# Patient Record
Sex: FEMALE | Race: BLACK OR AFRICAN AMERICAN | Employment: UNEMPLOYED | URBAN - METROPOLITAN AREA
[De-identification: names, ages, dates, MRNs, and addresses within clinical notes are randomized per-mention and may not be internally consistent; named-entity substitution may affect disease eponyms.]

---

## 2017-01-04 ENCOUNTER — HOSPITAL ENCOUNTER (OUTPATIENT)
Dept: PHYSICAL THERAPY | Age: 42
Discharge: HOME | End: 2017-01-04
Attending: FAMILY MEDICINE
Payer: MEDICARE

## 2017-01-04 ENCOUNTER — HOSPITAL ENCOUNTER (OUTPATIENT)
Dept: GENERAL RADIOLOGY | Age: 42
Discharge: HOME | End: 2017-01-04
Attending: FAMILY MEDICINE
Payer: MEDICARE

## 2017-01-04 DIAGNOSIS — R13.12 OROPHARYNGEAL DYSPHAGIA: ICD-10-CM

## 2017-01-04 PROCEDURE — G8998 SWALLOW D/C STATUS: HCPCS

## 2017-01-04 PROCEDURE — 74230 X-RAY XM SWLNG FUNCJ C+: CPT

## 2017-01-04 PROCEDURE — 92611 MOTION FLUOROSCOPY/SWALLOW: CPT

## 2017-01-04 PROCEDURE — 74011000255 HC RX REV CODE- 255

## 2017-01-04 PROCEDURE — G8997 SWALLOW GOAL STATUS: HCPCS

## 2017-01-04 PROCEDURE — G8996 SWALLOW CURRENT STATUS: HCPCS

## 2017-01-04 RX ADMIN — BARIUM SULFATE 15 ML: 400 SUSPENSION ORAL at 10:55

## 2017-01-04 RX ADMIN — BARIUM SULFATE 30 ML: 0.81 POWDER, FOR SUSPENSION ORAL at 10:55

## 2017-01-04 RX ADMIN — BARIUM SULFATE 15 ML: 400 PASTE ORAL at 10:55

## 2017-01-19 ENCOUNTER — HOSPITAL ENCOUNTER (OUTPATIENT)
Dept: PHYSICAL THERAPY | Age: 42
Discharge: HOME | End: 2017-01-19
Payer: MEDICARE

## 2017-01-19 PROCEDURE — 97110 THERAPEUTIC EXERCISES: CPT

## 2017-01-19 PROCEDURE — 92507 TX SP LANG VOICE COMM INDIV: CPT

## 2017-01-19 PROCEDURE — 92526 ORAL FUNCTION THERAPY: CPT

## 2017-01-19 NOTE — PROGRESS NOTES
Yaneth Gavin 31  [facility] PHYSICAL THERAPY  [address] [facility] [address], [address] - Phone: [phone]  Fax: [phone] [address]          Patient Name: Claudia Blankenship : 1975   Treatment/Medical Diagnosis: Dysphagia, post-stroke [I69.391]   Onset Date: Chronic, CVA 2014    Referral Source: Randolph [facility]): 2016   Prior Hospitalization: See Medical History Provider #: [id number]   Prior Level of Function: WC dependent since CVA in 2014; requires assistance with bed mobility   Comorbidities: HTN   Medications: Verified on Patient Summary List   Visits from [facility]: 2 Missed Visits: 0     Goal/Measure of Progress Goal Met? 1. Patient will require mod A X 1 with rolling to R side to allow caregivers greater ease with ADL's. Status at last Eval: Mod/max A x 2 Current Status: NT no   2. Patient will be compliant with home exercise program.   Status at last Eval: NA Current Status: Not initiated no     Key Functional Changes/Progress: Pt presented to PT clinic on 2017 for first appointment since initial evaluation 2016. Pt's mother reports that they had noticed increased depression of skull R temporal region a few months ago. Pt's reports that recently they noticed swelling R side of face ~ 3 weeks ago. Pt's mother reports that they saw neurologist at the end of December, and he ordered CT scan, which was performed 2016. 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 Pt's mother reports that they have noticed no change in function. Pt's mother reports that they have been doing leg exercises at home, and that they have been transferring her with [facility] lift.  Pt's mother reports that neurologist did not recommend that helmet be worn, and stated that pt should continue with PT.  OT attempted to call neurologist to await clarification of any restrictions related to issues with skull flap. We are awaiting return call, and therefore, did not transfer patient out of [facility] during 1/19/2017 treatment session. LE strength was assessed in w/c and is as follows:  R hip flex = 3/5  L hip flex = 3-/5  R hip abd/add = 3-/5  L hip abd/add = 2-/5  B knee ext = 4/5  B knee flex = 4/5  R ankle DF = 3-/5  L ankle DF = 4/5  R ankle PF = 4-/5  L ankle PF = 4-/5  Problem List: pain affecting function, decrease ROM, decrease strength, impaired gait/ balance, decrease ADL/ functional abilitiies, decrease activity tolerance, decrease flexibility/ joint mobility and decrease transfer abilities   Treatment Plan may include any combination of the following: Therapeutic exercise, Therapeutic activities, Neuromuscular re-education, Physical agent/modality, Gait/balance training, Manual therapy, Patient education, Functional mobility training, Home safety training and Stair training  Patient Goal(s) has been updated and includes:      Goals for this certification period include and are to be achieved in   2-4  weeks:   1. Patient will require min A X 1 with rolling to R side to allow caregivers greater ease with ADL's. .  2. Patient will require mod A X 1 with sit <> supine tranfers to improve independence with transfers and decrease load on caregiver. 3. Patient will be independent with home exercise program.  Frequency / Duration:   Patient to be seen   2   times per week for   2-4    weeks:  G-Codes (GP): Mobility [id number] Current  CN= 100%   Goal  CK= 40-59%. The severity rating is based on the Other supine to sit transfer    Assessments/Recommendations: Patient would benefit from continued skilled PT services to address decreased ROM, decreased strength, decreased balance and impaired functional mobility.   Please clarify whether it is safe for patient to transfer out of / using transfer board at this time, as we do not have a Jeff lift available in the clinic. Also, please clarify whether pt needs to wear a helmet for such transfers, and if there are any other restrictions that should be followed until pt undergoes surgery. Patient will be discharged from outpt PT when she goes in for surgery, and will need clearance to resume PT following surgery. If you have any questions/comments please contact us directly at [phone]. Thank you for allowing us to assist in the care of your patient. Therapist Signature: Melisa Paez PT Date: 1/09/2039   Certification Period:  Reporting Period: 12/6/2016-3/5/2017  12/6/2016-1/19/2017 Time: 12:02 PM   NOTE TO PHYSICIAN:  PLEASE COMPLETE THE ORDERS BELOW AND FAX TO   [facility] Physical Therapy: [phone]. If you are unable to process this request in 24 hours please contact our office: [phone].    ___ I have read the above report and request that my patient continue as recommended.   ___ I have read the above report and request that my patient continue therapy with the following changes/special instructions: ________________________________________________   ___ I have read the above report and request that my patient be discharged from therapy.      Physician Signature:        Date:       Time:

## 2017-01-19 NOTE — PROGRESS NOTES
Yaneth Gavin 31  [facility] PHYSICAL THERAPY  [address] [facility] [address], [address] - Phone: [phone]  Fax: [phone]  [facility] THERAPY          Patient Name: Max Dominguez : 1975   Medical/Treatment Diagnosis: Generalized muscle weakness [M62.81]  Stroke [facility]) [I63.9]   Onset Date:     Referral Source: [facility]): 16   Prior Hospitalization: See Medical History Provider #: [id number]   Prior Level of Function: Dep since CVA   Comorbidities: na   Medications: Verified on Patient Summary List   Visits from [facility]: 1 Missed Visits: 0     Goal/Measure of Progress Goal Met? 1. Ind HEP   Status at last Eval: Provided  Current Status: Teaching family Progressing    2. Left SH flexion to 75 degrees to assist with dressing    Status at last Eval: 0-25 Current Status: 0-25 no   3. Wash chest with set up    Status at last Eval: Unable  Current Status: unable no   4. Left SH abd to 75 degrees to ease donning deodorant   Status at last Eval: Unable  Current Status: unable no     Key Functional Changes/Progress: Patient only seen once since eval so goals carry over. Problem List: decrease ROM, decrease strength, decrease ADL/ functional abilitiies and decrease transfer abilities   Treatment Plan may include any combination of the following: Therapeutic exercise, Therapeutic activities, Neuromuscular re-education, Manual therapy, Patient education and ADLs/IADLs  Patient Goal(s) has been updated and includes:  Walk and talk again.  Goals for this certification period include and are to be achieved in   4  weeks: 1. Shoulder flexion to 75 degrees to assist with dressing   2. Shoulder abd to 75 degrees to ease donning deodorant  3. Wash chest with set up  4. Don deodorant with set up  5.  Doff overhead shirt with min A    Frequency / Duration:   Patient to be seen   2   times per week for   4 weeks:  G-Codes (GO): na  Assessments/Recommendations: Continue out patient OT to address goals    If you have any questions/comments please contact us directly at [phone]. Thank you for allowing us to assist in the care of your patient. Therapist Signature: SYLVIA Montelongo/L Date: 5/01/1831   Certification Period:  Reporting Period: 12/6/16-3/5/17  12/6/16-1/19/17 Time: 12:27 PM   NOTE TO PHYSICIAN:  PLEASE COMPLETE THE ORDERS BELOW AND FAX TO   [facility] Physical Therapy: [phone]. If you are unable to process this request in 24 hours please contact our office: [phone].    ___ I have read the above report and request that my patient continue as recommended.   ___ I have read the above report and request that my patient continue therapy with the following changes/special instructions: ________________________________________________   ___ I have read the above report and request that my patient be discharged from therapy.      Physician Signature:        Date:       Time:

## 2017-01-19 NOTE — PROGRESS NOTES
PHYSICAL THERAPY - DAILY TREATMENT NOTE    Patient Name: Jaci Blackburn        Date: 2017  : 1975   YES Patient  Verified  Visit #:   2   of   8  Insurance: Payor: BLUE CROSS MEDICARE / Plan: [facility] / Product Type: Managed Care Medicare /      In time: 10:00 Out time: 10:30   Total Treatment Time: 30     Medicare Time Tracking (below)   Total Timed Codes (min):  NA 1:1 Treatment Time:  NA     TREATMENT AREA =  Dysphagia, post-stroke [I69.391]  SUBJECTIVE    Pain Level (on 0 to 10 scale):  0  / 10   Medication Changes/New allergies or changes in medical history, any new surgeries or procedures? NO    If yes, update Summary List   Subjective Functional Status/Changes:  []  No changes reported     Pt's mother reports that they had noticed increased depression of skull R temporal region a few months ago. Pt's reports that recently they noticed swelling R side of face ~ 3 weeks ago. Pt's mother reports that they saw neurologist at the end of December, and he ordered CT scan, which was performed 2016. 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 Pt's mother reports that they have noticed no change in function. Pt's mother reports that they have been doing leg exercises at home, and that they have been transferring her with Elana Roberto lift.   Pt's mother reports that neurologist did not recommend that helmet be worn, and stated that pt should continue with PT.        OBJECTIVE    20 min Therapeutic Exercise:  [x]  See flow sheet   Rationale:      increase ROM and increase strength to improve the patients ability to perform ADLs/IADLs, functional mobility safely with increased independence     10 min Patient Education:  YES  Reviewed HEP   []  Progressed/Changed HEP based on:   Discussed that we will need clearance from neurologist to resume out of w/c activity as we do not have Srinivas Johnson and would have to perform sliding board transfer; also advised that will need to DC prior to surgery and will need new script to resume PT after surgery     Other Objective/Functional Measures:    LE strength:  R hip flex = 3/5  L hip flex = 3-/5  R hip abd/add = 3-/5  L hip abd/add = 2-/5  B knee ext = 4/5  B knee flex = 4/5  R ankle DF = 3-/5  L ankle DF = 4/5  R ankle PF = 4-/5  L ankle PF = 4-/5     Post Treatment Pain Level (on 0 to 10) scale:   0  / 10     ASSESSMENT    Assessment/Changes in Function:     LE strength improved  Mobility not assessed due to awaiting clearance from neurologist     [x]  See Progress Note/Recertification   Patient will continue to benefit from skilled PT services to modify and progress therapeutic interventions, address functional mobility deficits, address ROM deficits, address strength deficits, analyze and address soft tissue restrictions, analyze and cue movement patterns, analyze and modify body mechanics/ergonomics, assess and modify postural abnormalities, address imbalance/dizziness and instruct in home and community integration to attain remaining goals.    Progress toward goals / Updated goals:    See progress note     PLAN    [x]  Upgrade activities as tolerated YES Continue plan of care   []  Discharge due to :    []  Other:      Therapist: Brie Presley PT    Date: 1/19/2017 Time: 9:59 AM     Future Appointments  Date Time Provider Cale Fan   1/19/2017 10:00 AM Brie Presley PT [facility]

## 2017-01-19 NOTE — PROGRESS NOTES
[address] [place] [address]. Speech Language Pathology: Daily Note      Patient Name: Hattie Willett   2017   : 1975  [x]  Patient  Verified  Payor: BLUE CROSS MEDICARE / Plan: [facility] / Product Type: Managed Care Medicare /   In time:0900  Out time:1000  Total Treatment Time (min): 60  1:1 Treatment Time ( [address] Only): 60  Visit #: 1 of 8-10    SUBJECTIVE  Pain Level (0-10 scale): 0    Subjective functional status/changes:   Patient's mother reported barely utilize PEG tube for feedings. Further, brother and mother report no desire for AAC device. OBJECTIVE  Treatment provided includes the following. Increase/Improve:  []  Voice Quality []  Expressive Language [x]  Oral Motor Skills   []  Vocal Loudness []  Auditory Comprehension [x]  Eating/Swallowing Skills   []  Vocal Cord Function []  Writing Skills []  Laryngeal/Pharyngeal Function   []  Resonance []  Reading Comprehension []   []  Breath Support/Coord. []  Cognitive-Linguistic Skills []   []  Speech Intelligibility []  Safety Awareness []   [x]  Articulation []  Attention []   []  Fluency []  Memory []     Decrease:  [x]  Dysphagia []  Apraxia []  Dysphonia   [x]  Dysarthria []  Dysfluency []  Cognitive Ling. Deficit   []  Aphasia []  Vocal Cord Dysfunction []  Dysphonia     Tasks Completed:  -required maxA to seal lips during swallow of saliva management.  -completed oral motor exercises with mod-maxA to improve articulation  - produced initial /m/ words with correct marking on /m/ with 50% accuracy with Darnell, 100% with SLP tactile assistance. - produced bilabial plosive phonemes/words with maxA with 0% accuracy  -produced lingua-alveolar phonemes/words with maxA with  0% accuracy.     Progress towards goals:  Patient has demonstrated the ability to produce minimal lingual movements with tactile cues increasing to moderate lingual movements with visual feedback. Patient is unable to complete movements with speech despite max cues. Patient's family reports she is able to produce speech at home, but these productions are very loose approximations. Patient's family further refuses AAC device as they state they \"just want her to speak again. \" Patient completed dysphagia treatment for saliva management with verbal and physical cues to seal lips prior to swallowing, however unable to effectively create strong seal independently, consistently. Patient would continue to benefit from skilled ST services to maximize oral motor potential, improve articulation, and improve saliva management. HEP: oral motor exercises; labial words utilize compensatory strategies of overarticulate and speak loud. Patient/Caregiver instruction/education: importance of completing HEP for carryover; pt verbalized comprehension. (minutes:10)          Pain Level (0-10 scale) post treatment: 0    ASSESSMENT  []   Improving appropriately and progressing toward goals  []   Improving slowly and progressing toward goals  []   Approximating goals/maximum potential  [x]   Continues to benefit from skilled therapy to address remaining functional deficits  []   Not progressing toward goals and plan of care will be adjusted    PLAN   [x]  Continue Plan of Care    []  See progress note/recertification  [x]  Upgrade activities as tolerated      []  Discharge due to:  []  Other:    Short-term Goals:  1. Produce basic phoneme CV combinations, provided mod multimodal cues, 7/10 trials  2. Perform oral motor exercises in therapy and at home to increase oral motor strength/range-of-motion for articulation tasks with mod visual/verbal cues in 4/5 trials. 3. Perform oral-motor coordination/strengthening exercises to improve tolerance of least restrictive feeds, mod visual/verbal cues  4.  Utilize compensatory swallow techniques (decrease intake rate/bite size, cyclical ingestion, increased rotary manipulation) to facilitate effective mastication, deglutition, and saliva management with mod visual/verbal cues    Ann Bejarano MS [facility] SLP  1/19/2017, 11:07 AM

## 2017-01-19 NOTE — PROGRESS NOTES
OCCUPATIONAL THERAPY - DAILY TREATMENT NOTE    Patient Name: Dede Taylor        Date: 2017  : 1975   YES Patient  Verified  Visit #:   2   of   8  Insurance: Payor: BLUE CROSS MEDICARE / Plan: [facility] / Product Type: Managed Care Medicare /      In time: 8:30 Out time: 9:00   Total Treatment Time: 30     Medicare Time Tracking (below)   Total Timed Codes (min):  30 1:1 Treatment Time:  30     TREATMENT AREA =  anette UE    SUBJECTIVE    Pain Level (on 0 to 10 scale):  0  / 10   Medication Changes/New allergies or changes in medical history, any new surgeries or procedures? NO    If yes, update Summary List   Subjective Functional Status/Changes:  []  No changes reported     Mom: Noam Giordano is having surgery  for a brain flap. OBJECTIVE    30 min Therapeutic Exercise:  [x]  See flow sheet   Rationale:      increase ROM and increase strength to improve the patients ability with ADLs      min Patient Education:  YES  Reviewed HEP   []  Progressed/Changed HEP based on: Other Objective/Functional Measures:    Called Dr Tran for written clearance to continue therapy due to pending surgery. Left messages twice. Left patient in [facility] to avoid injury. Began UE exercises. Post Treatment Pain Level (on 0 to 10) scale:   0  / 10     ASSESSMENT  Assessment/Changes in Function:    Family needs to work with patient at home with UE exercises. [x]  See Progress Note/Recertification   Patient will continue to benefit from skilled OT services to address ROM deficits and address strength deficits to attain remaining goals.    Progress toward goals / Updated goals:    Same goals carryover since patient has not been seen since eval     PLAN  [x]  Upgrade activities as tolerated YES Continue plan of care   []  Discharge due to :    []  Other:      Therapist: Audra Stephenson OTR/L    Date: 2017 Time: 12:26 PM

## 2017-01-19 NOTE — PROGRESS NOTES
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 Produce basic phoneme CV combinations, provided mod multimodal cues, 7/10 trials  Status at last note/certification: not yet addressed  Current Status: not met    Goal: 2. Perform oral motor exercises in therapy and at home to increase oral motor strength/range-of-motion for articulation tasks with mod visual/verbal cues in 4/5 trials. Status at last note/certification: not yet addressed  Current Status: not met     Goal: 3. Participate in further evaluation of swallow function (i.e. Modified Barium Swallow) to assess s/sx of aspiration, ensure diet tolerance and patient safety during meals. Status at last note/certification: MBS completed with no aspiration/penetration events across thin, nectar, honey, and pudding consistencies given by spoon. Current Status: met    Goal: 4. Perform oral-motor/laryngeal strengthening exercises to improve tolerance of least restrictive feeds, mod visual/verbal cues  Status at last note/certification: not yet addressed  Current Status: not met     Goal: 6.  Utilize compensatory swallow techniques (decrease intake rate/bite size, cyclical ingestion, increased rotary manipulation) to facilitate effective mastication and deglutition, mod visual/verbal cues  Status at last note/certification: not yet addressed  Current Status: not met    Key functional changes: Patient has only attended evaluation and Modified Barium Swallow Study at this time due to scheduling conflicts. MBS was completed with the following results,\"  MBS completed with no aspiration/penetration events across thin, nectar, honey, and pudding consistencies given by spoon. Patient with severe oral apraxia which severely limits oral phase of swallow across all consistencies. Patient with oral delay, premature spillage (100% or bolus) to the level of the pyriform with swallow delay(~5seconds), however functional swallow with no aspiration/penetration events. No oral or pharyngeal residue s/p swallow. Attempted cracker trial, however patient unable to manipulate orally; removed digitally by SLP. Pt presents with mild oral  dysphagia, as evidenced above, which places pt at low risk for aspiration. At this time, safest for puree solid, thin liquid diet. \" No other goals have been addressed at this time. Problems/ barriers to goal attainment: physical     Problem List: Dysarthria and Dysphagia    Treatment Plan: Oral Motor Therapeutic Excerise, Dysarthria Treatment and Dysphagia Treatment    Patient Goal (s) has been updated and includes: Family goals include, \"Her to talk normally again\"     Goals for this certification period to be accomplished in 8-10 treatments:  Pt will:   1. Produce basic phoneme CV combinations, provided mod multimodal cues, 7/10 trials  2. Perform oral motor exercises in therapy and at home to increase oral motor strength/range-of-motion for articulation tasks with mod visual/verbal cues in 4/5 trials. 5. Perform oral-motor coordination/strengthening exercises to improve tolerance of least restrictive feeds, mod visual/verbal cues  6.  Utilize compensatory swallow techniques (decrease intake rate/bite size, cyclical ingestion, increased rotary manipulation) to facilitate effective mastication, deglutition, and saliva management with mod visual/verbal cues    Frequency / Duration: Patient to be seen 1-2 times per week for 4 weeks:    Assessment / Recommendations:Recommend continue skilled ST services to address dysphagia and dysarthria. Certification Period: 1/19/2017- 2/16/2017    [id number] [facility] 1/19/2017 9:00 AM      ________________________________________________________________________    I certify that the above Therapy Services are being furnished while the patient is under my care. I agree with the treatment plan and certify that this therapy is necessary. Y or N I have read the above and request that my patient continue as recommended.   Y or N I have read the above report and request that my patient continue therapy with the following changes/special instructions  Y or N I have read the above report and request that my patient be discharged from therapy    Physician's Signature:_________________ Date:___________Time:__________      Please sign and return via fax to [facility] at [facility]  Fax: [phone]

## 2017-02-02 ENCOUNTER — HOSPITAL ENCOUNTER (OUTPATIENT)
Dept: PHYSICAL THERAPY | Age: 42
Discharge: HOME | End: 2017-02-02
Payer: MEDICARE

## 2017-02-02 PROCEDURE — 92526 ORAL FUNCTION THERAPY: CPT

## 2017-02-02 PROCEDURE — G8979 MOBILITY GOAL STATUS: HCPCS

## 2017-02-02 PROCEDURE — G8980 MOBILITY D/C STATUS: HCPCS

## 2017-02-02 PROCEDURE — 97110 THERAPEUTIC EXERCISES: CPT

## 2017-02-02 PROCEDURE — G8985 CARRY GOAL STATUS: HCPCS

## 2017-02-02 PROCEDURE — 92507 TX SP LANG VOICE COMM INDIV: CPT

## 2017-02-02 PROCEDURE — G8986 CARRY D/C STATUS: HCPCS

## 2017-02-02 NOTE — PROGRESS NOTES
PHYSICAL THERAPY - DAILY TREATMENT NOTE    Patient Name: Max Dominguez        Date: 2017  : 1975   YES Patient  Verified  Visit #:   3   of   8  Insurance: Payor: BLUE CROSS MEDICARE / Plan: [facility] / Product Type: Managed Care Medicare /      In time: 10:30 Out time: 11:00   Total Treatment Time: 30     Medicare Time Tracking (below)   Total Timed Codes (min):  NA 1:1 Treatment Time:  NA     TREATMENT AREA =  Generalized muscle weakness [M62.81]  Stroke ([address] [I63.9]  SUBJECTIVE    Pain Level (on 0 to 10 scale):  0  / 10   Medication Changes/New allergies or changes in medical history, any new surgeries or procedures? NO    If yes, update Summary List   Subjective Functional Status/Changes:  []  No changes reported     Pt's mother reports that pt is scheduled for surgery on 2017. Pt's mother states that they have not gotten anything from MD clearing pt for transfer board transfer out of w/c during therapy.           OBJECTIVE    25 min Therapeutic Exercise:  [x]  See flow sheet   Rationale:      increase ROM and increase strength to improve the patients ability to perform ADLs/IADLs, functional mobility with increased safety and independence     5 min Patient Education:  YES  Reviewed HEP   []  Progressed/Changed HEP based on:   Advised pt and family that we will DC due to surgery scheduled for next week; advised that we will need new orders to resume therapy after surgery     Other Objective/Functional Measures:    Exercises per flowsheet    LE strength:  L hip flex = 3-/5, R hip flex = 3/5  L hip abd/add = 2/5, R hip abd = 3-/5  B knee ext = 4/5  B knee flex = 4/5  L ankle DF = 3-/5, R ankle DF = 4/5  L ankle PF = 3/5, R ankle DF = 4-/5       Post Treatment Pain Level (on 0 to 10) scale:   0  / 10     ASSESSMENT    Assessment/Changes in Function:     Tolerated LE strengthening exercises in w/c  Mobility held due to awaiting clearance from MD     []  See Progress Note/Recertification      Progress toward goals / Updated goals:    See discharge note     PLAN    []  Upgrade activities as tolerated NO Continue plan of care   [x]  Discharge due to : Scheduled for surgery 2/7/2017   []  Other:      Therapist: Melvina Ragsdale PT    Date: 2/2/2017 Time: 11:03 AM     Future Appointments  Date Time Provider Cale Fan   2/2/2017 11:30 AM Stalin Page OTR/L [facility] AT [address]       \;..,

## 2017-02-02 NOTE — PROGRESS NOTES
OCCUPATIONAL THERAPY - DAILY TREATMENT NOTE    Patient Name: Max Dominguez        Date: 2017  : 1975   YES Patient  Verified  Visit #:   3   of   8  Insurance: Payor: BLUE CROSS MEDICARE / Plan: [facility] / Product Type: Managed Care Medicare /      In time: 11:25 Out time: 11:55   Total Treatment Time: 30     Medicare Time Tracking (below)   Total Timed Codes (min):  30 1:1 Treatment Time:  30     TREATMENT AREA =  anette UE    SUBJECTIVE    Pain Level (on 0 to 10 scale):  0  / 10   Medication Changes/New allergies or changes in medical history, any new surgeries or procedures? NO    If yes, update Summary List   Subjective Functional Status/Changes:  []  No changes reported     Patient's mother reports patient is feeding herself. OBJECTIVE    30 min Therapeutic Exercise:  [x]  See flow sheet   Rationale:      increase ROM and increase strength to improve the patients ability with ADLs      min Patient Education:  YES  Reviewed HEP   []  Progressed/Changed HEP based on: Other Objective/Functional Measures:    Encouraged patient and her mother to work on increasing ADL activity by asking patient to try a task first and then assist as needed. She is to work on applying deodorant and washing her chest. Demo to family place/hold SH flex and abd to build muscle strength. Post Treatment Pain Level (on 0 to 10) scale:   0  / 10     ASSESSMENT  Assessment/Changes in Function:     Patient needs to be discharged for upcoming surgery.       [x]  See Progress Note/Recertification   Patient will continue to benefit from skilled OT services to na   Progress toward goals / Updated goals:    Most goals unmet / only 3 therapy visits     PLAN  []  Upgrade activities as tolerated NO Continue plan of care   [x]  Discharge due to : Planned surgery 17   []  Other:      Therapist: SYLVIA Walker/VALENTINE    Date: 2017 Time: 12:15 PM

## 2017-02-02 NOTE — PROGRESS NOTES
[facility]  [address]  Ph: [phone] Fax: [phone]     Speech Pathology -- Discharge Summary    Name: Governor Amezcua        Date: 2017   : 1975   MD: Lita Valerio PA-C    Treatment Diagnosis: dysarthria; dysphagia   Start of Care: 12.6.16    Visits from Start of Care: 2  Missed Visits: 0    Summary of Care: Patient is demonstrating minimal progress within the therapy room; however patient's family reports improvements with communication at home. Patient attempts minimal words in therapy despite max cues. Very poor velum control suspected. Patient completing oral motor exercises with maximum assistance. Further, patient continues to require multiple cues for saliva management; strong wet coughing throughout sessions likely due to poor saliva management. Patient is getting surgery on 16 for skull flap repair; due to high risk surgery will recommend discharge from [facility] at this time and would appreciate reevaluation order from patient's physician to determine any new speech or language deficits. Assessment / Recommendations: Other: Discharge from therapy with reevaluation order from physician s/p skull flap surgery    [address] [place] [address] SLP 2017 10:43 AM    ________________________________________________________________________    NOTE TO PHYSICIAN:  Please complete the following and fax to: [facility] at  [facility] at [phone]    . Retain this original for your records. If you are unable to process this request in   24 hours, please contact our office.      ____ I have read the above report and request that my patient continue therapy with the following changes/special instructions:  ____ I have read the above report and request that my patient be discharged from therapy    Physician's Signature:_____________________ Date:___________Time:__________

## 2017-02-02 NOTE — PROGRESS NOTES
[address] [place] [address]. Speech Language Pathology: Daily Note      Patient Name: Tristian Squires   2017   : 1975  [x]  Patient  Verified  Payor: BLUE CROSS MEDICARE / Plan: [facility] / Product Type: Managed Care Medicare /   In time:1000  Out time:1030  Total Treatment Time (min): 30  1:1 Treatment Time ( Only): 30  Visit #: 2 of 8-10    SUBJECTIVE  Pain Level (0-10 scale): 0    Subjective functional status/changes:   Family reporting patient with much improved speech at home. OBJECTIVE  Treatment provided includes the following. Increase/Improve:  []  Voice Quality []  Expressive Language [x]  Oral Motor Skills   []  Vocal Loudness []  Auditory Comprehension [x]  Eating/Swallowing Skills   []  Vocal Cord Function []  Writing Skills []  Laryngeal/Pharyngeal Function   []  Resonance []  Reading Comprehension []   []  Breath Support/Coord. []  Cognitive-Linguistic Skills []   [x]  Speech Intelligibility []  Safety Awareness []   [x]  Articulation []  Attention []   []  Fluency []  Memory []     Decrease:  [x]  Dysphagia []  Apraxia []  Dysphonia   [x]  Dysarthria []  Dysfluency []  Cognitive Ling. Deficit   []  Aphasia []  Vocal Cord Dysfunction []  Dysphonia     Tasks Completed:  - bilabial plosive CV combinations with 5% accuracy with max cues and models.  -maximum cues to swallow saliva throughout session    Progress towards goals:  Patient is demonstrating minimal progress within the therapy room; however patient's family reports improvements at home. Minimal participation this day; family reports this is unlike the patient. Patient attempting minimal words despite max cues. Very poor velum control suspeted. Further, patient continues to require multiple cues for saliva management; strong wet coughing throughout session due to poor saliva management.  Patient is getting surgery on 16 for skull flap repair; due to high risk surgery will recommend discharge from [facility] at this time and would appreciate reevaluation order from patient's physician to determine any new speech or language deficits. HEP: 2 syllable words, oral motor exercises    Patient/Caregiver instruction/education: importance of completing HEP for carryover; family verbalized comprehension. (minutes:5)        Pain Level (0-10 scale) post treatment: 5    ASSESSMENT  []   Improving appropriately and progressing toward goals  []   Improving slowly and progressing toward goals  []   Approximating goals/maximum potential  []   Continues to benefit from skilled therapy to address remaining functional deficits  [x]   Not progressing toward goals and plan of care will be adjusted    PLAN   []  Continue Plan of Care    []  See progress note/recertification  []  Upgrade activities as tolerated      [x]  Discharge due to: major surgery  []  Other:    Short-term Goals:  1. Produce basic phoneme CV combinations, provided mod multimodal cues, 7/10 trials  2. Perform oral motor exercises in therapy and at home to increase oral motor strength/range-of-motion for articulation tasks with mod visual/verbal cues in 4/5 trials. 3. Perform oral-motor coordination/strengthening exercises to improve tolerance of least restrictive feeds, mod visual/verbal cues  4.  Utilize compensatory swallow techniques (decrease intake rate/bite size, cyclical ingestion, increased rotary manipulation) to facilitate effective mastication, deglutition, and saliva management with mod visual/verbal cues    Connie Bejarano MS [facility] SLP   2/2/2017, 9:56 AM

## 2017-02-02 NOTE — PROGRESS NOTES
[address] [facility] PHYSICAL THERAPY  [address] [facility] [address], [address] - Phone: [phone]  Fax: [phone]  [address] [place] [address]          Patient Name: Yariel Baca : 1975   Medical/Treatment Diagnosis: Generalized muscle weakness [M62.81]  Stroke [facility]) [I63.9]   Onset Date:     Referral Source: Miriam Calderón Start of Care [facility]): 16   Prior Hospitalization: See Medical History Provider #: [id number]   Prior Level of Function: Dep since CVA   Comorbidities: na   Medications: Verified on Patient Summary List   Visits from [facility]: 3 Missed Visits: 0       Goal/Measure of Progress Goal Met? 1.  SH flexion to 75 degrees to assist with dressing   Status at last Eval: 0-25 Current Status: 0-25 no   2. Wash chest with set up   Status at last Eval: Dep Current Status: Dep no   3. Apply deodorant with set up   Status at last Eval: Dep Current Status: Dep no   4. Doff overhead shirt with min A   Status at last Eval: Dep Current Status: Dep no     Key Functional Changes/Progress: Patient only seen for eval and 2 visits and is now pending surgery for bone flap repair and will be discharged. G-Codes (GO): Carry  [id number] Goal  CM= 80-99%   D/C  CM= 80-99%. The severity rating is based on the Other clinic observation   Assessments/Recommendations: Other: DC OT due to pending surgery. If you have any questions/comments please contact us directly at [phone]. Thank you for allowing us to assist in the care of your patient.     Therapist Signature: SYLVIA Guerrero/VALENTINE Date: 17   Reporting Period: 17-17 Time: 12:15 PM

## 2017-02-06 NOTE — PROGRESS NOTES
PHYSICAL THERAPY - DAILY TREATMENT NOTE    Patient Name: Karine Christine        Date: 2017  : 1975   YES Patient  Verified  Visit #:   3   of   8  Insurance: Payor: BLUE CROSS MEDICARE / Plan: [facility] / Product Type: Managed Care Medicare /      In time: 10:30 Out time: 11:00   Total Treatment Time: 30     Medicare Time Tracking (below)   Total Timed Codes (min):  NA 1:1 Treatment Time:  NA     TREATMENT AREA =  Dysphagia, post-stroke [I69.391]  SUBJECTIVE    Pain Level (on 0 to 10 scale):  0  / 10   Medication Changes/New allergies or changes in medical history, any new surgeries or procedures? NO    If yes, update Summary List   Subjective Functional Status/Changes:  []  No changes reported     Pt's mother reports that pt is scheduled for surgery on 2017. Pt's mother states that they have not gotten anything from MD clearing pt for transfer board transfer out of w/c during therapy.           OBJECTIVE    25 min Therapeutic Exercise:  [x]  See flow sheet   Rationale:      increase ROM and increase strength to improve the patients ability to perform ADLs/IADLs, functional mobility with increased safety and independence     5 min Patient Education:  YES  Reviewed HEP   []  Progressed/Changed HEP based on:   Advised pt and family that we will DC due to surgery scheduled for next week; advised that we will need new orders to resume therapy after surgery     Other Objective/Functional Measures:    Exercises per flowsheet    LE strength:  L hip flex = 3-/5, R hip flex = 3/5  L hip abd/add = 2/5, R hip abd = 3-/5  B knee ext = 4/5  B knee flex = 4/5  L ankle DF = 3-/5, R ankle DF = 4/5  L ankle PF = 3/5, R ankle DF = 4-/5       Post Treatment Pain Level (on 0 to 10) scale:   0  / 10     ASSESSMENT    Assessment/Changes in Function:     Tolerated LE strengthening exercises in w/c  Mobility held due to awaiting clearance from MD     []  See Progress Note/Recertification      Progress toward goals / Updated goals:    See discharge note     PLAN    []  Upgrade activities as tolerated NO Continue plan of care   [x]  Discharge due to : Scheduled for surgery 2/7/2017   []  Other:      Therapist: Kaylan Pinto PT    Date: 2/2/2017 Time: 11:03 AM     No future appointments.     \;..,

## 2017-02-06 NOTE — PROGRESS NOTES
[address] [facility] PHYSICAL THERAPY  [address] [facility] [address], [address] - Phone: [phone]  Fax: [phone]  DISCHARGE SUMMARY FOR PHYSICAL THERAPY          Patient Name: Governor Amezcua : 1975   Treatment/Medical Diagnosis: Dysphagia, post-stroke [I69.391]   Onset Date: Chronic, CVA 2014      Referral Source: Abdon Ragland [facility]): 2016   Prior Hospitalization: See Medical History Provider #: [id number]   Prior Level of Function: WC dependent since CVA in 2014; requires assistance with bed mobility   Comorbidities: HTN   Medications: Verified on Patient Summary List   Visits from [facility]: 3 Missed Visits: 0     Goal/Measure of Progress Goal Met? 1. Patient will require min A X 1 with rolling to R side to allow caregivers greater ease with ADL's. Status at last Eval: Mod/max A x 1 Current Status: NT no   2. Patient will require mod A X 1 with sit <> supine tranfers to improve independence with transfers and decrease load on caregiver. Status at last Eval: Max A x 1 Current Status: NT no   3. Patient will be independent with home exercise program.   Status at last Eval: Not initiated Current Status: Family reports pt performing LE AROM sitting in w/c progressing     Key Functional Changes/Progress: Patient progress has been limited by inability to transfer out of w/c in clinic due to concerns regarding safety of transferring without Nancylee Rumble lift due to disintegration of skull flap reported by patient's family; have not received response from MD regarding whether patient can safely transfer out of w/c without Nancylee Rumble lift.   Patient has performed gentle LE ROM/strengthening exercises in w/c.  LE strength is as follows:  L hip flex = 3-/5, R hip flex = 3/5  L hip abd/add = 2/5, R hip abd = 3-/5  B knee ext = 4/5  B knee flex = 4/5  L ankle DF = 3-/5, R ankle DF = 4/5  L ankle PF = 3/5, R ankle DF = 4-/5    G-Codes (GP): Mobility   Goal  CK= 40-59%  D/C  CN= 100%. The severity rating is based on the Other Supine to sit transfer    Assessments/Recommendations: Other: Discontinue therapy. Scheduled for surgery 2/7/2017. Will need new prescription to resume PT after surgery as appropriate. If you have any questions/comments please contact us directly at [phone]. Thank you for allowing us to assist in the care of your patient. Therapist Signature: Jayda Peter PT Date: 2/2/2017   Reporting Period: 1/19/2017-2/2/2017 Time: 11:11 AM     NOTE TO PHYSICIAN:  PLEASE COMPLETE THE ORDERS BELOW AND FAX TO   [facility]: [phone]. If you are unable to process this request in 24 hours please contact our office: [phone].    ___ I have read the above report and request that my patient be discharged from therapy.      Physician Signature:        Date:______Time:

## 2017-04-06 ENCOUNTER — APPOINTMENT (OUTPATIENT)
Dept: PHYSICAL THERAPY | Age: 42
End: 2017-04-06
Payer: MEDICARE

## 2017-04-13 ENCOUNTER — APPOINTMENT (OUTPATIENT)
Dept: PHYSICAL THERAPY | Age: 42
End: 2017-04-13
Payer: MEDICARE

## 2017-04-25 ENCOUNTER — APPOINTMENT (OUTPATIENT)
Dept: PHYSICAL THERAPY | Age: 42
End: 2017-04-25
Payer: MEDICARE

## 2017-04-27 ENCOUNTER — APPOINTMENT (OUTPATIENT)
Dept: PHYSICAL THERAPY | Age: 42
End: 2017-04-27
Payer: MEDICARE

## 2017-05-02 ENCOUNTER — HOSPITAL ENCOUNTER (OUTPATIENT)
Dept: PHYSICAL THERAPY | Age: 42
Discharge: HOME | End: 2017-05-02
Payer: MEDICARE

## 2017-05-02 PROCEDURE — G8988 SELF CARE GOAL STATUS: HCPCS

## 2017-05-02 PROCEDURE — G8980 MOBILITY D/C STATUS: HCPCS

## 2017-05-02 PROCEDURE — G9163 LANG EXPRESS GOAL STATUS: HCPCS

## 2017-05-02 PROCEDURE — G9162 LANG EXPRESS CURRENT STATUS: HCPCS

## 2017-05-02 PROCEDURE — 92522 EVALUATE SPEECH PRODUCTION: CPT

## 2017-05-02 PROCEDURE — G8978 MOBILITY CURRENT STATUS: HCPCS

## 2017-05-02 PROCEDURE — 97165 OT EVAL LOW COMPLEX 30 MIN: CPT

## 2017-05-02 PROCEDURE — G8979 MOBILITY GOAL STATUS: HCPCS

## 2017-05-02 PROCEDURE — G8987 SELF CARE CURRENT STATUS: HCPCS

## 2017-05-02 PROCEDURE — G8989 SELF CARE D/C STATUS: HCPCS

## 2017-05-02 PROCEDURE — 97162 PT EVAL MOD COMPLEX 30 MIN: CPT

## 2017-05-02 NOTE — PROGRESS NOTES
[address] [facility] PHYSICAL THERAPY  [address] [facility] [address], [address] - Phone: [phone]  Fax: [phone] / [address] [facility]  Patient Name: Cody Trevino : 1975   Medical   Diagnosis: Gait instability [R26.81]  Stroke (cerebrum) ([address] [I63.9] Treatment Diagnosis: CVA   Onset Date: Chronic; CVA      Referral Source: Adelene Bumpers, [address] [place] [address]): 2017   Prior Hospitalization: See medical history Provider #: [id number]   Prior Level of Function: WC dependent since CVA in 2014; dependent with transfers and bed mobility   Comorbidities: HTN   Medications: Verified on Patient Summary List   The Plan of Care and following information is based on the information from the initial evaluation.   ===========================================================================================  Assessment / key information:  Patient is a 39y.o. year old female known to this PT clinic for previous treatment s/p CVA (2015 - 10/20/2017)  She was discharged at that time due to minimal progress with static sitting balance, transfers, and bed mobility (discharge status was max A X 2 for WC to mat transfer, unsupported sitting for 3 minute, poor dynamic sitting balance). Her mother and caregiver are present for the initial evaluation and provide the following history. Mother reports she had surgery to repair skull flap on 2017. Mother reports she has been cleared to resume activity and has no restrictions. Mother states she is transferring patient with a kike lift at home. Patient demonstrates poor static sitting balance as she is unable to maintain unsupported sitting without max A. She requires max A X 2 with WC <> mat (with slide board), supine <> sit transfers and with scooting. She requires max A X 1 for rolling supine to sidelying.   LE strength is essentially unchanged since her last visit on 2/2/2017. Feel skilled PT services are not warranted at this time. Provided much education to patient's mother and caregiver regarding importance of trunk stability for sitting balance. Instructed them on home exercises and ADL training in order to improve patient's static sitting balance and functional ability. Strength (MMT):  Hip L (1-5) R (1-5)   Hip Flexion 3- 3   Hip Ext NT NT   Hip ABD 2 3-   Hip ADD 2 2      Knee L (1-5) R (1-5)   Knee Flexion 4 4   Knee Extension 4 4   Ankle PF 3 3   Ankle DF 3 4       ===========================================================================================  Eval Complexity: History: HIGH Complexity :3+ comorbidities / personal factors will impact the outcome/ POC Exam:MEDIUM Complexity : 3 Standardized tests and measures addressing body structure, function, activity limitation and / or participation in recreation  Presentation: MEDIUM Complexity : Evolving with changing characteristics  Clinical Decision Making:MEDIUM Complexity : FOTO score of 26-74Overall Complexity:MEDIUM    Problem List: decrease ROM, decrease strength, impaired gait/ balance, decrease ADL/ functional abilitiies, decrease activity tolerance, decrease flexibility/ joint mobility and decrease transfer abilities   Treatment Plan may include any combination of the following: Other: NA  Patient / Family readiness to learn indicated by: asking questions, trying to perform skills and interest  Persons(s) to be included in education: patient (P) and family support person (FSP);list mother and caregiver  Barriers to Learning/Limitations: yes;  cognitive  Measures taken: Provided education to mother and caregiver   Patient Goal (s): \"walk\"   Patient self reported health status: fair  Rehabilitation Potential: poor   Short Term Goals: To be accomplished in  1  treatments:  1. Complete evaluation. 2.  Provide education for exercises/ADL training.     Frequency / Duration:   Patient to be seen  1 times per week for 1  treatments:    **Patient to be discharged upon completion of initial evaluation as skilled PT services are not indicated at this time. Patient / Caregiver education and instruction: self care and exercises  G-Codes (GP): Mobility [id number] Current  CN= 100%   Goal  CN= 100%  D/C  CN= 100%. The severity rating is based on the Other supine to sit transfer  Therapist Signature: Van Brizuela, PT Date: 8/2/1458   Certification Period: 5/2/2017 - 5/2/2017 Time: 2:57 PM   ===========================================================================================  I certify that the above Physical Therapy Services are being furnished while the patient is under my care. I agree with the treatment plan and certify that this therapy is necessary. Physician Signature:        Date:       Time:     Please sign and return to [facility] or you may fax the signed copy to [phone]. Thank you.

## 2017-05-02 NOTE — PROGRESS NOTES
Yaneth Gavin [address]  [facility] PHYSICAL THERAPY  [address] [facility] [address], [address] - Phone: [phone]  Fax: [phone] / 6655 Matt Gelacio [facility] THERAPY SERVICES  Patient Name: Ryann Shirley : 1975   Medical   Diagnosis: Generalized muscle weakness [M62.81]  Stroke (cerebrum) ([address] [I63.9] Treatment Diagnosis: CVA   Onset Date: > 1 year ago     [facility]   Prior Hospitalization: See medical history Provider #: [id number]   Prior Level of Function: Dep since CVA   Comorbidities: HTN: CVA   Medications: Verified on Patient Summary List   The Plan of Care and following information is based on the information from the initial evaluation.   ===========================================================================================  Assessment / key information:Patient is a 38 yo female s/p CVA with anette UE weakness, L>R. LUE AROM SH flex/abd 0-30; elbow 0-40; supination to wrist WFL. Gross left active fist. RUE SH flex 0-30; abd 0-80; elbow to hand WFL. Poor anette UE strength. Dep for transfers. Poor static sitting balance. Right  38# left 16#. Sensation intact. No UE pain or edema. ADLs- dependent for bathing and dressing. She is able to apply deodorant and feed herself. Discussed importance with patient, CG, Pretty Guardado and patient's mother of patient working at home on sitting balance, bowel/bladder control, overall endurance out of bed and engaging in simple ADLs. Patient needs to show an investment in her own improvement and start making progress at home. She is at baseline for ADLs, no change since last seen in OT and therefore has reached a plateau at this time. No skilled OT services indicated at this time.  ===========================================================================================  Eval Complexity: History: LOW Complexity : Brief history review ;  Examination: MEDIUM Complexity : 3-5 performance deficits relating to physical, cognitive , or psychosocial skils that result in activity limitations and / or participation restrictions; Decision Making:MEDIUM Complexity : Patient may present with comorbidities that affect occupational performnce. Miniml to moderate modification of tasks or assistance (eg, physical or verbal ) with assesment(s) is necessary to enable patient to complete evaluation   Problem List: Decreased range of motion, Decreased strength, Decreased coordination/prehension and Decreased ADL/functional abilities    Treatment Plan may include any combination of the following: Other- at baseline  Patient / Family readiness to learn indicated by: asking questions, trying to perform skills and interest  Persons(s) to be included in education: patient (P) and family support person (FSP);list mother  Barriers to Learning/Limitations: yes;  other motivation   Measures taken:    Patient Goal (s): Walk and talk   Patient self reported health status: fair  Rehabilitation Potential: fair    Evaluation only. Patient / Caregiver education and instruction: exercises  G-Codes (GO): Self Care  Current  CM= 80-99%   Goal  CM= 80-99%   D/C  CM= 80-99%. The severity rating is based on the Other clinic observation   Therapist Signature: Tony Krabbe, OTR/L Date: 6/1/8550   Certification Period: 5/2/17 Time: 1:00 PM   ===========================================================================================  I certify that the above Occupational Therapy Services are being furnished while the patient is under my care. I agree with the treatment plan and certify that this therapy is necessary. Physician Signature:        Date:       Time:     Please sign and return to [facility] or you may fax the signed copy [phone]. Thank you.

## 2017-05-02 NOTE — PROGRESS NOTES
OCCUPATIONAL THERAPY - DAILY TREATMENT NOTE    Patient Name: Abril Pedro        Date: 2017  : 1975   YES Patient  Verified  Visit #:   1   of   1  Insurance: Payor: BLUE CROSS MEDICARE / Plan: [facility] / Product Type: Managed Care Medicare /      In time: 10:00 Out time: 11:00   Total Treatment Time: 60     Medicare Time Tracking (below)   Total Timed Codes (min):  na 1:1 Treatment Time:  60     TREATMENT AREA =  anette UE    SUBJECTIVE    Pain Level (on 0 to 10 scale):  0  / 10   Medication Changes/New allergies or changes in medical history, any new surgeries or procedures? NO    If yes, update Summary List   Subjective Functional Status/Changes:  []  No changes reported     Mom reports that patient is feeding herself and applying deodorant. OBJECTIVE     min Therapeutic Exercise:  [x]  See flow sheet        min Patient Education:  YES  Reviewed HEP- ROM    []  Progressed/Changed HEP based on: Other Objective/Functional Measures:    See initial eval for details patient and family instructed in detail how to progress patient at home- time out of bed, B&B program; unsupported sitting and simple ADLs. She needs to invest in a home program to start progress and show her investment in her own care and then start an out patient program since she has had therapy before with minimal progress. Post Treatment Pain Level (on 0 to 10) scale:   0  / 10     ASSESSMENT  Assessment/Changes in Function:     Patient is at baseline from last OT eval. No skilled OT needs at this time. OT Eval Complexity Justification:  Patient History: Low- known patient   Examination : high- full eval  Clinical Decision Making: Med- low motivation and carryover at home       [x]  See Progress Note/Recertification   Patient will continue to benefit from skilled OT services to na to attain remaining goals. Progress toward goals / Updated goals:    Eval only.  DC to HEP PLAN  []  Upgrade activities as tolerated NO Continue plan of care   [x]  Discharge due to : Patient at baseline.    []  Other:      Therapist: JORDAN Garduno    Date: 5/2/2017 Time: 1:00 PM

## 2017-05-02 NOTE — PROGRESS NOTES
[facility] at [facility]  [address] [facility], [facility], Critical access hospital  Ph: [phone] Fax: [phone] of Care/ Statement of Necessity for Speech Therapy Services    Alex Morrow        Date: 2017   : 1975  DAWIT Castro  Diagnosis: dysarthria  Onset Date:2014  Start of Care: 2017  Prior Level of Function:independent  Comorbidities: Stroke, Chronic Cough    The Plan of Care and following information is based on the information from the initial evaluation. Assessment/ key information:   Patient is a 39year old female referred for speech pathology reevaluation to assess expressive communication s/p CVA and bone flap surgery in 2017. Patient currently lives at home with mother with caregiver assistance. Patient alert and attentive, responding appropriately with laughing, thumbs up, and smiling throughout conversation and evaluation tasks. Patient received skilled ST outpatient services from 2015-10/20/2015 with a discharge due to, \"minimal progress demo since onset with regard to effective phonation\"; patient also received skilled ST services 2016-2017 with minimal progress appreciated re: oral motor movements and dysarthria. Per family report, patient has been communicating via words, gestures, and signing to communicate wants and needs. Patient's motor speech was evaluated this day finding severe dysarthria c/b minimal lingual and labial movements resulting in imprecise articulation, as well as decreased breath support, decreased rate of speech, and hypernasality. Patient with minimal L-R lingual movements, very minimal lingual raise movement. Patient with continued left lingual deviation at rest. Labially, patient with slow, incoordinated movements and minimal movement of left side, and very slow approximation movements. Patient's speech is <10% intelligible at the word level.  SLP, (trained ear) was able to comprehend x1 word throughout evaluation. These results are consistent with previous evaluation in December 2016 despite family report of extensively completing HEP daily. Minimal to no functional gains appreciated with use of HEP. With this taken into consideration, patient and family was introduced to speech generating communication device for consistent, clear communication of wants and needs. Patient was very interested in device and was able to demonstrate the ability of choosing single and multiple words to generate phrases to communicate wants and needs with sample communication board. Patient would benefit from skilled ST services to address expressive communication with the use of a speech generating device. Problem List:   Dysarthria    Treatment Plan may include any combination of the following: OtherCommunication Device     Patient / Family readiness to learn indicated by: asking questions, trying to perform skills and interest    Persons(s) to be included in education:   patient (P) and family support person (FSP);list mother, caregiver    Barriers to Learning/Limitations: yes;  sensory deficits-vision/hearing/speech    Patient Goal (s): \"To be able to communicate wants and needs. \" Per family report    Rehabilitation Potential: good    Short Term Goals: To be accomplished in 8-10 treatments  Patient will:  1. Demonstrate the ability to communicate utilizing picture communication system in 8/10 opportunities. 2. Sequence 3-5 pictures to communicate at the sentence level with Darnell. 3. Participate in assessment of Speech Generating Device with Darnell. 4. Participate in education with caregivers on home exercise program with Darnell.     Long Term Goals: To be accomplished in 4-6 weeks   Patient will:  1. Communicate at the sentence level utilizing speech generating communication device with Darnell.        G Codes (GN):  [id number] Current  CM= 80-99%  [id number] Goals  CJ= 20-39%    The severity rating is based on the following outcomes:    National Outcomes Measures (NOMS)   Professional Judgement    Frequency / Duration: Patient to be seen 1-2 times per week for 4 weeks:    Patient/ Caregiver education and instruction: Compensatory Techniques    Certification Period: 5/2/17-5/30/17      Eugenia Ruiz [facility] 5/2/2017 11:53 AM  ________________________________________________________________________    I certify that the above Therapy Services are being furnished while the patient is under my care. I agree with the treatment plan and certify that this therapy is necessary. Y or N I have read the above and request that my patient continue as recommended.   Y or N I have read the above report and request that my patient continue therapy with the following changes/special instructions:  Y or N I have read the above report and request that my patient be discharged from therapy    Physician's Signature:____________________  Date/Time:________________    Please sign and return to [facility]   Fax: [phone]

## 2017-05-02 NOTE — PROGRESS NOTES
PHYSICAL THERAPY - DAILY TREATMENT NOTE    Patient Name: Ananda Chin        Date: 2017  : 1975   YES Patient  Verified  Visit #:   1   of   1  Insurance: Payor: BLUE CROSS MEDICARE / Plan: [facility] / Product Type: Managed Care Medicare /      In time: 9:10 Out time: 10:00   Total Treatment Time: 50     Medicare Time Tracking (below)   Total Timed Codes (min):  50 1:1 Treatment Time:  50     TREATMENT AREA =  CVA with L hemiparesis    SUBJECTIVE    Pain Level (on 0 to 10 scale):  0  / 10   Medication Changes/New allergies or changes in medical history, any new surgeries or procedures? NO    If yes, update Summary List   Subjective Functional Status/Changes:  []  No changes reported     Patient's mother and caregiver are present for the initial eval and provide the following history. 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 Mom states she has been released from surgeon and infectious MD with no restrictions. Mom states she is still using kike lift for transfers. Mom states she spends her days either in bed or in the power WC. She has an aide there 8:00 - 4:00 5 days/week. States she has not been sitting unsupported \"in a while. \"  States she is able to steer the [address] [place] [address] independently at home. Mom reports she has continued to help Francine with her exercises. OBJECTIVE    Physical Therapy Evaluation  Neurologic    Posture: [x] Poor    [] Fair    [] Good    Describe:   Increased thoracic kyphosis, cervical flexion     Gait: [] Normal    [] Abnormal    Device:      Describe: non ambulatory    ROM:                             AROM    PROM   Shoulder Left Right Left Right   Flex [facility] WFL   Ext [facility] WFL   ABD [facility]   ER [facility] WFL WFL   IR WFL WFL WFL WFL            AROM    PROM   Knee Left Right Left Right   Ext [facility] WFL WFL   Flex WFL WFL WFL WFL             AROM                           PROM  Hip Left Right Left Right   Flex [facility] WFL   Ext [facility] WFL   [facility]   ER [facility] WFL WFL   IR WFL WFL WFL WFL                                            AROM      PROM   Ankle Left Right Left Right   Ext [facility] WFL WFL   Flex [facility] WFL WFL WFL     Strength (MMT):  Hip L (1-5) R (1-5)   Hip Flexion 3- 3   Hip Ext NT NT   Hip ABD 2 3-   Hip ADD 2 2     Knee L (1-5) R (1-5)   Knee Flexion 4 4   Knee Extension 4 4   Ankle PF 3 3   Ankle DF 3 4   Other       Tone: decreased    Motor Control: decreased      Functional Mobility      Bed Mobility:      Scooting: Max A X 2       Rolling: Max A X 1       Sit-Supine: Max A X 2               WC <> mat: Max A X 2 with slide board      Transfers:       Sit-Stand: NT       Floor-Stand: NT        Patient unable to perform static sitting unsupported. Behavior: [x] Cooperative    [] Impulsive    [] Agitated    [] Perseverative    [] Confused   Oriented x:    Cognition: [x] One Step Commands   [] Multiple Commands   [] Displays Neglect [] R  [] L       Other Objective/Functional Measures:    See above    Patient's mother and caregiver were instructed in home exercises to improve static sitting balance and trunk stability. Post Treatment Pain Level (on 0 to 10) scale:   0  / 10     ASSESSMENT  Assessment/Changes in Function:     Justification for Eval Code Complexity:  Patient History (low 0, mod 1-2, high 3-4): high (HTN, WC bound, past experience with PT, cognition/level of motivation)  Examination (low 1-2, mod 3+, high 4+): mod (see above)  Clinical Presentation (low stable or uncomplicated, mod evolving or changing, high unstable or unpredictable): mod  Clinical Decision Making (low , mod 26-74, high 1-25): mod     []  See Progress Note/Recertification        Progress toward goals / Updated goals:    Goals established. PLAN    []  Upgrade activities as tolerated YES Continue plan of care   [x]  Discharge due to :  No need for skilled PT services at this time.    []  Other:      Therapist: Carletta Gottron, PT    Date: 5/2/2017 Time: 9:12 AM     Future Appointments  Date Time Provider Cale Fan   5/2/2017 10:00 AM SYLVIA Nguyen/L [facility]   5/2/2017 11:00 AM Keli Farris Basia [facility]

## 2017-05-04 ENCOUNTER — HOSPITAL ENCOUNTER (OUTPATIENT)
Dept: PHYSICAL THERAPY | Age: 42
Discharge: HOME | End: 2017-05-04
Payer: MEDICARE

## 2017-05-04 PROCEDURE — 92507 TX SP LANG VOICE COMM INDIV: CPT

## 2017-05-04 NOTE — PROGRESS NOTES
[address] [place] [address]. Speech Language Pathology: Daily Note - Diagnostic Therapy Session for Alternative/Augmentative Communication Device      Patient Name: Alex Morrow   2017   : 1975  [x]  Patient  Verified  Payor: BLUE CROSS MEDICARE / Plan: [facility] CARE / Product Type: Managed Care Medicare /   In time:1200  Out time: 1300  Total Treatment Time (min): 60  1:1 Treatment Time (MC Only): 60  Visit #: 1 of 8-10    SUBJECTIVE  Pain Level (0-10 scale): 0    Subjective functional status/changes:   Patient reported no functional change in status. Participated in diagnostic therapy with [employer] representative and mother in the room. OBJECTIVE  Treatment provided includes the following. Increase/Improve:  []  Voice Quality [x]  Expressive Language []  Oral Motor Skills   []  Vocal Loudness []  Auditory Comprehension []  Eating/Swallowing Skills   []  Vocal Cord Function []  Writing Skills []  Laryngeal/Pharyngeal Function   []  Resonance []  Reading Comprehension []   []  Breath Support/Coord. []  Cognitive-Linguistic Skills []   []  Speech Intelligibility []  Safety Awareness []   []  Articulation []  Attention []   []  Fluency []  Memory []     Decrease:  []  Dysphagia []  Apraxia []  Dysphonia   [x]  Dysarthria []  Dysfluency []  Cognitive Ling. Deficit   []  Aphasia []  Vocal Cord Dysfunction []  Dysphonia     Tasks Completed:  -diagnostic therapy- See progress note for full details     Progress towards goals:  Patient demonstrated progress towards goals readily participating in diagnostic therapy session with [employer] rep. Patient was able to demonstrate the ability to communicate utilizing SGD (ie. \"I want Tylenol\" \"I want to eat a hamburger\" . Patient benefits from small SGD to improve patient ability to set up in a way that improves direct selection and access.  Further, patient benefits from pictures/text to identify wants/needs. Patient would cont to benefit from skilled ST services to address communication with SGD. HEP: communication board/funding packet    Patient/Caregiver instruction/education: .   importance of completing HEP for carryover; pt verbalized comprehension. (minutes:10)     Pain Level (0-10 scale) post treatment: 0    ASSESSMENT  []   Improving appropriately and progressing toward goals  [x]   Improving slowly and progressing toward goals  []   Approximating goals/maximum potential  [x]   Continues to benefit from skilled therapy to address remaining functional deficits  []   Not progressing toward goals and plan of care will be adjusted    PLAN   [x]  Continue Plan of Care    []  See progress note/recertification  []  Upgrade activities as tolerated      []  Discharge due to:  []  Other:    Short-term Goals:  Patient will:  1. Demonstrate the ability to communicate utilizing picture communication system in 8/10 opportunities. 2. Sequence 3-5 pictures to communicate at the sentence level with Darnell. 3. Participate in assessment of Speech Generating Device with Darnell. 4. Participate in education with caregivers on home exercise program with Zaria Alfredo [facility] SLP  5/4/2017, 3:34 PM

## 2017-05-11 NOTE — PROGRESS NOTES
[facility]  Álvaro Sanchez [facility], [facility], [address]  Ph: [phone] Fax: [phone]       Speech Pathology - Progress Note / AAC Diagnostic Therapy Session    Erskine Gowers        Date: 2017- Late entry for May 4, 2017  : 1975  Diagnosis: dysarthria  Start of Care: 17    Summary of Care: Patient participated in diagnostic therapy for augmentative/alternative communication device. Report to follow.     Speech Speech Generating Device Evaluation Form     Patient Name Lucas Puentes Date of Birth 1975  Patient Address [address] [facility] [place] [address]  Patient's Primary  Angeline Jacob Phone [phone]  Name of [address] [place] [address] [facility]  Evaluator Address [address] [place] [address] [place] [address]  Evaluator Phone [phone] Email [email or web address]  Date of Evaluation May 4, 2017  Primary Diagnosis CVA Date of Onset  Physician Dr. Lety Forrester 2: Patient Background Information   Client's Name [address] [facility] [place] Diagnosis CVA  Speech Intelligibility <10%  Anticipated Length of Impairment Chronic Current Course of Impairment Stable  Timeframe of the Impairment The client's lifespan  Anticipated Future Course of Impairment Remain stable at the present level  Any other description needed: n/a  Prognosis for Speech Production Poor  Pertinent Background Information Patient is a 39year old female referred for speech pathology reevaluation to  assess expressive communication s/p CVA in . Patient currently lives at home with mother with caregiver  assistance. Patient is alert and attentive, responding appropriately with laughing, thumbs up, and smiling  throughout conversation and evaluation tasks.  Patient received skilled ST outpatient services from 2015-  10/20/2015 with a discharge due to, \"minimal progress demo since onset with regard to effective phonation\"; patient  also received skilled ST services December 2016-February 2017 with minimal progress appreciated re: oral motor  movements and dysarthria. Per family report, patient has been communicating via words, gestures, and signing to  communicate wants and needs. Patient's motor speech was evaluated finding severe dysarthria c/b minimal lingual  and labial movements resulting in imprecise articulation, as well as decreased breath support, decreased rate of  speech, and hypernasality. Patient with minimal L-R lingual movements, very minimal lingual raise movement. Patient with continued left lingual deviation at rest. Labially, patient with slow, incoordinated movements and  minimal movement of left side, and very slow approximation movements. Patient's speech is <10% intelligible at the  word level. SLP, (trained ear) was able to comprehend x1 word throughout evaluation. These results are consistent  with previous evaluation in December 2016 despite family report of extensively completing HEP daily. Minimal to no  functional gains appreciated with use of HEP. With this taken into consideration, patient and family was introduced  to speech generating communication device for consistent, clear communication of wants and needs. Patient was  very interested in device and was able to demonstrate the ability of choosing single and multiple words to generate  phrases to communicate wants and needs with sample communication board. Patient would benefit from and SGD  to communicate wants and needs at home, participate in medical appointments, and reintegrate into social  situations. Does client currently own a communication device?  no  Make/Model of current communication device if applicable n/a  Date of Purchase of current communication device    Section 3: Vision and Hearing   Patient's Name Lavgricelda Ebook Gluek  Hearing Aid None Hearing Status The patient possesses the hearing abilities to effectively use an SGD to  communicate functionally. Does the patient wear contacts/glasses no Vision Status Left visual side neglect, otherwise functional  Other relevant information about sensory status Patient would benefit from pertinent communication information  placed on R side, and will benefit from training to attend to left side of screen    Section 4: Motor and Accessing   Patient's Name Cira Munoz  Is the patient ambulatory? no  If the patient is non-ambulatory list the make and model of the wheelchair unknown  Motor skills as relates to using the SGD Patient is able to utilize dominant hand to press buttons and hold a stylus if  necessary for direct selection. Direct Select Yes Direct Select Options finger/other body part  Auditory/Visual Scanning Requirements if using scanning none-not using scanning  Switch Type n/a  Switch location n/a  Description of access method Direct selection will be used by patient's dominant (right) hand. Positions while using the SGD Patient will use this device while sitting up in a chair. Section 5: Cognitive Ability   Patient's Name Cira Munoz  Does this person attend school? No If yes, current grade level. If no, highest grade level reached. Employed? No If employed, please describe the nature of the work. Currently receiving speech-language services No  Cognitive Impairment No impairment. Client skills that will enable them to achieve functional communication skills The patient possesses the  cognitive/linguistic abilities to effectively use an SGD to communicate and achieve functional communication goals. Skills being addressed through [employer] n/a  Current Receptive Language Patient's receptive language skills are within functional limits. This patient  understands yes/no questions without prompting, understands wh- questions, people's names, and is able to  identify appropriate pictures.  Patient can sequence events with no difficultly. Expressive Language At this time, patient attempts to verbalize words/short phrases, utilizes sign language with  weekday caregiver, and utilizes gestures. Behaviors at not being understood If the patient is not immediately understood with current mode of communication,  the patient continues to attempt to verbalize the message. However the family often presents the patient with a  variety of possible desires, in which the patient will state yes/no until desired need it met. The patient states /eh/ for  yes and /uh/ for no. Section 6: Communication Partners and Environments   Patient's Name [address] [facility] (includes doctor's offices)Community  Additional information about communication environments This will be used in a variety of settings to empower  patient with speech to express wants and needs. Addtional information about Communication Partners The patient will utilize the device to communicate to a variety of  partners including at home to get basic wants and needs met, in the community for social interaction, and in the  medical setting to participate in discussions with doctors and therapists. Section 7: Patient / Terry Luna The family and caregivers are with the patient 24/7 and eager to communicate effectively  with her. The mother specifically reports she feels helpless when the patient is attempting to communicate a want  or need, however she is unable to do so effectively. The mother and caregiver report excitement to train and assist  patient in the use of SGD, as well as utilize the device for augmenting speech. Patient and mother was present  throughout evaluation with local representative and to create firm environmental support for the patient.     Section 8: Trials with AAC Equipment   Patient's Name Joesph Caro  Name of Device 1 Trialed/Considered T-7 SGD  Access Method Trialed/Considered with Device 1 Direct Select  Implications of Trial with Device 1 This device was trialed, however it was too small for the patient to effectively  visually scan, especially with the patient's right side neglect. Therefore the patient would not be able to efficiently  or effectively communicate wants and needs. Name of Device 2 Trialed/Considered T-15 SGD  Access Method Trialed/Considered with Device 2 Direct Select  Implications of Trial with Device 2 This device was considered, however patient was too large to carry, which would  keep the patient from utilizing the device in a variety of communication settings. Further, patient was unable to  move device around due to physical limitations, even on a table. Name of Device 3 Trialed/Considered Indie SGD  Access Method Trialed/Considered with Device 3 Direct Select  Implications of Trial with Device 3 Patient was able to communicate at the phrase length to SLP, representative, and  mother with minimal difficulty. Patient was able to request medicine due to headache, what she wanted for lunch,  and her desire to take a nap. She was also able to effectively see the icons without strain, and with minimal visual  cues, patient was able to scan the full device. Further, she was able to hold and move the device on the table for  improved access. Rule out lower cost devices - Patient would require the ability to spontaneously create messages to effectively  communicate a variety of wants and needs in a variety of communication situations. If patient only had prerecorded  messages, patient would not be able to continue to build communication effectiveness at home or in diverse  settings. - Due to patient's right side neglect, spelling would not be an efficient or effective way to  communicate due to neglect of R side of keyboard. Patient also benefits from picture symbols to assist in  communication.   Proficiency with selected device Independent Communicator  Additional information about proficiency with selected device The selected device gives the patient the flexibility to talk  about a broad range of age appropriate topics in a variety of ways. Patient is able to combine pictures, text, and prestored  phrases to create various messages. Section 9: Rationale for Selected Device   Input Features/Selection Technique Direct SelectDynamic DisplayKeyboard-QWERTY  Message Characteristics/features PhotographsIconsCombination of pictures and wordsWordsSpelling  Optimal Symbol forms for current use. Patient would benefit from a variety of picture symbols, words, keyboard, and  prestored phrases with picture icons  Other symbol form(s) expected to be needed in the next two years. Patient will utilize the keyboard and camera to  continually update the device with changing wants and needs overtime. Required size of Grid in inches 5x5 Required number of Pages 2+  Required number of items on a page 15+  Font Size can be adjusted to accommodate visual needs Yes  Can adjust color/contrasts to accomodate visual needs Yes Number of messages needed more than 500  Changing levels/overlays User understands how to navigate dynamic displays or would have the potential to do so  Vocabulary Rate Enhance Prestored messages using words or pictures  Rate enhancement techniques under consideration. Patient will be trained to visually scan left, however currently,  important messages will be fixed on the right of the screen for improved digital selection and access. Voice output Synthesized speech Visual Display Small Screen under 12 inches  Communication Output Needed Visual Displays  Portability Requires small device  Describe Portability Issues Patient requires small device to improve ability to manage set up for improved access.   Accessories required Floor mount  Description of accessories needed and why n/a  Medical justification for any accessories needed n/a  Summary of Rationale and any other information you may wish to add n/a    Section 10: Daily Communication Needs   Patient's Name Chloe Swenson  Daily Communication Needs With the use of SGD, the patient will be able to get her physical needs met at home. Patient will be able to effectively communicate when she is hungry, thirsty, needs to bathe, or if she is in pain and  would like medication (this is a regular issue at home and the family/caregiver is unable to understand when the  patient states \"Tylenol\"). The patient will be able to effectively communicate with her family, caregiver, and  doctors/therapists which previously was very limited due to patient's severe dysarthria. Patient has previously had  to communicate through mother/caregiver to ask questions at medical appointments, however she will have the  ability to communicate for herself with the use of a speech generating device. This will assist in decision making  with a reliable, comprehensible way to communicate to medical professionals, family, caregivers, and friends. Ability to meet communication needs with non-SGD approaches As previously stated, the patient receive skilled ST  services for 3 months in 2015 with minimal progress appreciated, as well as introduce to HEP in December 2016  which was completed daily prior to attending reevaluation in May with minimal gains appreciated. Patient continues  to remain minimally intelligible due to severity of dysarthria. Prior to the stroke, the patient was able to  communicate wants and needs with no difficulty and was an independent, working adult. Due to patient the lack of  speech generation, patient is not able to effectively utilize writing to communicate. Further, patient knows sign  language, however due to lack of family and community knowledge, the patient is unable to communicate  effectively. This patient's basic needs cannot be met without a device.  A SGD will be used to assist the client in a  reaching a functional level of communication. Why the current device does not meet the client's needs if applicable. n/a  Why the current device does not meet the client's needs if applicable (More space) n/a    Section 11: Functional Communicaton GoalsFunctional  Communication Goals Short Term Goals: 1. Patient will utilize greetings in 9/10 opportunities with [place] in  a variety of contexts. 2. Patient will communicate wants and needs at the word level independently in a variety of  contexts. 3. Patient will exchange information re: wants and needs with question prompts in 8/10 opportunities with  Darn[place]. 4. Patient, family and caregiver will participate in training of SGD set up/maintenance/ and customization with  [place]. Long Term Goals: 1. Patient will utilize SGD communication device to communicate wants and needs at the  phrase level with [place]. 2. Patient and support system will demonstrate the ability to customize device to update  messages as wants/needs change with Darnell. Section 12: SGD and Accessories Recommended   SGD recommended Indie SGD Eye Control Recommended None  Medical justification for Eye Control if recommended  Wheelchair Mount No < Table Mount No [place] No  Medical justification for mount/s n/a  Accessories n/a  Medical justification for accessories n/a    Section 13: Treatment Plan   Treatment Plan SLP will see the patient 1-2x/week for 4-8 weeks with the patient, family and caregivers. They will  participate in education for initial set-up and maintenance of device. Further, patient will participate in initial  vocabulary analysis, selection, and programming. The patient will be trained to communicate effectively at the  phrase level and demonstrate the ability to continue to customize SGD for developing wants/needs over time.   Signature Page     Signed May 11, 2017  Jnaice Draper [address] SLP  [employer] Certication Number [id number] License Number [id number]    The Speech Pathologist performing this evaluation is not an employee of and does not  have a financial relationship with the supplier of any SGD. A copy of this report, including Treatment Plan and Goals, has been sent to the  prescribing Physician for review and prescription. Cali Wise [facility] SLP 5/11/2017 11:18 AM    ______________________________________________________________________    NOTE TO PHYSICIAN    Please complete the following and fax to: [facility] at  [facility] at [phone]     Retain this original for your records. If you are unable to process this request in   24 hours, please contact our office.      ____ I have read the above report and request that my patient continue therapy with the following changes/special instructions:  ____ I have read the above report and request that my patient be discharged from therapy    Physician's Signature:_____________________ Date:___________Time:__________

## 2017-05-22 ENCOUNTER — HOSPITAL ENCOUNTER (EMERGENCY)
Age: 42
Discharge: HOME | End: 2017-05-23
Attending: EMERGENCY MEDICINE | Admitting: EMERGENCY MEDICINE
Payer: MEDICARE

## 2017-05-22 DIAGNOSIS — K94.23 PEG TUBE MALFUNCTION (HCC): Primary | ICD-10-CM

## 2017-05-22 DIAGNOSIS — E87.6 HYPOKALEMIA: ICD-10-CM

## 2017-05-22 PROCEDURE — 99284 EMERGENCY DEPT VISIT MOD MDM: CPT

## 2017-05-22 PROCEDURE — 96374 THER/PROPH/DIAG INJ IV PUSH: CPT

## 2017-05-22 PROCEDURE — 96376 TX/PRO/DX INJ SAME DRUG ADON: CPT

## 2017-05-22 PROCEDURE — 77030018836 HC SOL IRR NACL ICUM -A

## 2017-05-23 ENCOUNTER — ANESTHESIA EVENT (OUTPATIENT)
Dept: ENDOSCOPY | Age: 42
End: 2017-05-23
Payer: MEDICARE

## 2017-05-23 ENCOUNTER — ANESTHESIA (OUTPATIENT)
Dept: ENDOSCOPY | Age: 42
End: 2017-05-23
Payer: MEDICARE

## 2017-05-23 VITALS
OXYGEN SATURATION: 97 % | TEMPERATURE: 98.6 F | HEART RATE: 78 BPM | RESPIRATION RATE: 20 BRPM | SYSTOLIC BLOOD PRESSURE: 133 MMHG | DIASTOLIC BLOOD PRESSURE: 93 MMHG

## 2017-05-23 LAB
ANION GAP BLD CALC-SCNC: 8 MMOL/L (ref 3–18)
BASOPHILS # BLD AUTO: 0.1 K/UL (ref 0–0.06)
BASOPHILS # BLD: 1 % (ref 0–2)
BUN SERPL-MCNC: 13 MG/DL (ref 7–18)
BUN/CREAT SERPL: 19 (ref 12–20)
CALCIUM SERPL-MCNC: 9.1 MG/DL (ref 8.5–10.1)
CHLORIDE SERPL-SCNC: 101 MMOL/L (ref 100–108)
CO2 SERPL-SCNC: 28 MMOL/L (ref 21–32)
CREAT SERPL-MCNC: 0.68 MG/DL (ref 0.6–1.3)
DIFFERENTIAL METHOD BLD: ABNORMAL
EOSINOPHIL # BLD: 0.1 K/UL (ref 0–0.4)
EOSINOPHIL NFR BLD: 2 % (ref 0–5)
ERYTHROCYTE [DISTWIDTH] IN BLOOD BY AUTOMATED COUNT: 12.8 % (ref 11.6–14.5)
GLUCOSE SERPL-MCNC: 86 MG/DL (ref 74–99)
HCT VFR BLD AUTO: 39.6 % (ref 35–45)
HGB BLD-MCNC: 13.5 G/DL (ref 12–16)
LYMPHOCYTES # BLD AUTO: 39 % (ref 21–52)
LYMPHOCYTES # BLD: 2.1 K/UL (ref 0.9–3.6)
MCH RBC QN AUTO: 31.3 PG (ref 24–34)
MCHC RBC AUTO-ENTMCNC: 34.1 G/DL (ref 31–37)
MCV RBC AUTO: 91.9 FL (ref 74–97)
MONOCYTES # BLD: 0.4 K/UL (ref 0.05–1.2)
MONOCYTES NFR BLD AUTO: 8 % (ref 3–10)
NEUTS SEG # BLD: 2.8 K/UL (ref 1.8–8)
NEUTS SEG NFR BLD AUTO: 50 % (ref 40–73)
PLATELET # BLD AUTO: 312 K/UL (ref 135–420)
PMV BLD AUTO: 9.2 FL (ref 9.2–11.8)
POTASSIUM SERPL-SCNC: 3 MMOL/L (ref 3.5–5.5)
RBC # BLD AUTO: 4.31 M/UL (ref 4.2–5.3)
RBC MORPH BLD: ABNORMAL
SODIUM SERPL-SCNC: 137 MMOL/L (ref 136–145)
WBC # BLD AUTO: 5.5 K/UL (ref 4.6–13.2)

## 2017-05-23 PROCEDURE — 85025 COMPLETE CBC W/AUTO DIFF WBC: CPT

## 2017-05-23 PROCEDURE — 74011250636 HC RX REV CODE- 250/636

## 2017-05-23 PROCEDURE — 76060000032 HC ANESTHESIA 0.5 TO 1 HR: Performed by: INTERNAL MEDICINE

## 2017-05-23 PROCEDURE — 76040000007: Performed by: INTERNAL MEDICINE

## 2017-05-23 PROCEDURE — 77030005122 HC CATH GASTMY PEG BSC -B: Performed by: INTERNAL MEDICINE

## 2017-05-23 PROCEDURE — 74011250636 HC RX REV CODE- 250/636: Performed by: EMERGENCY MEDICINE

## 2017-05-23 PROCEDURE — 77030012058: Performed by: INTERNAL MEDICINE

## 2017-05-23 PROCEDURE — 74011250636 HC RX REV CODE- 250/636: Performed by: PHYSICIAN ASSISTANT

## 2017-05-23 PROCEDURE — 80048 BASIC METABOLIC PNL TOTAL CA: CPT

## 2017-05-23 PROCEDURE — 77030020765 HC CATH GASTMY N-BLN USEN -B: Performed by: INTERNAL MEDICINE

## 2017-05-23 PROCEDURE — C1769 GUIDE WIRE: HCPCS | Performed by: INTERNAL MEDICINE

## 2017-05-23 RX ORDER — DEXTROSE, SODIUM CHLORIDE, AND POTASSIUM CHLORIDE 5; .45; .15 G/100ML; G/100ML; G/100ML
125 INJECTION INTRAVENOUS CONTINUOUS
Status: DISCONTINUED | OUTPATIENT
Start: 2017-05-23 | End: 2017-05-23 | Stop reason: HOSPADM

## 2017-05-23 RX ORDER — MORPHINE SULFATE 4 MG/ML
4 INJECTION, SOLUTION INTRAMUSCULAR; INTRAVENOUS
Status: COMPLETED | OUTPATIENT
Start: 2017-05-23 | End: 2017-05-23

## 2017-05-23 RX ORDER — PROPOFOL 10 MG/ML
INJECTION, EMULSION INTRAVENOUS AS NEEDED
Status: DISCONTINUED | OUTPATIENT
Start: 2017-05-23 | End: 2017-05-23 | Stop reason: HOSPADM

## 2017-05-23 RX ORDER — SODIUM CHLORIDE, SODIUM LACTATE, POTASSIUM CHLORIDE, CALCIUM CHLORIDE 600; 310; 30; 20 MG/100ML; MG/100ML; MG/100ML; MG/100ML
INJECTION, SOLUTION INTRAVENOUS
Status: DISCONTINUED | OUTPATIENT
Start: 2017-05-23 | End: 2017-05-23 | Stop reason: HOSPADM

## 2017-05-23 RX ADMIN — PROPOFOL 20 MG: 10 INJECTION, EMULSION INTRAVENOUS at 13:14

## 2017-05-23 RX ADMIN — SODIUM CHLORIDE, SODIUM LACTATE, POTASSIUM CHLORIDE, CALCIUM CHLORIDE: 600; 310; 30; 20 INJECTION, SOLUTION INTRAVENOUS at 13:01

## 2017-05-23 RX ADMIN — PROPOFOL 30 MG: 10 INJECTION, EMULSION INTRAVENOUS at 13:26

## 2017-05-23 RX ADMIN — PROPOFOL 20 MG: 10 INJECTION, EMULSION INTRAVENOUS at 13:30

## 2017-05-23 RX ADMIN — PROPOFOL 20 MG: 10 INJECTION, EMULSION INTRAVENOUS at 13:17

## 2017-05-23 RX ADMIN — PROPOFOL 50 MG: 10 INJECTION, EMULSION INTRAVENOUS at 13:08

## 2017-05-23 RX ADMIN — PROPOFOL 20 MG: 10 INJECTION, EMULSION INTRAVENOUS at 13:33

## 2017-05-23 RX ADMIN — PROPOFOL 20 MG: 10 INJECTION, EMULSION INTRAVENOUS at 13:12

## 2017-05-23 RX ADMIN — Medication 4 MG: at 07:30

## 2017-05-23 RX ADMIN — DEXTROSE MONOHYDRATE, SODIUM CHLORIDE, AND POTASSIUM CHLORIDE 125 ML/HR: 50; 4.5; 1.49 INJECTION, SOLUTION INTRAVENOUS at 11:13

## 2017-05-23 RX ADMIN — Medication 4 MG: at 03:48

## 2017-05-23 RX ADMIN — Medication 4 MG: at 11:14

## 2017-05-23 RX ADMIN — PROPOFOL 20 MG: 10 INJECTION, EMULSION INTRAVENOUS at 13:23

## 2017-05-23 RX ADMIN — PROPOFOL 20 MG: 10 INJECTION, EMULSION INTRAVENOUS at 13:10

## 2017-05-23 RX ADMIN — PROPOFOL 20 MG: 10 INJECTION, EMULSION INTRAVENOUS at 13:20

## 2017-05-23 RX ADMIN — DEXTROSE MONOHYDRATE, SODIUM CHLORIDE, AND POTASSIUM CHLORIDE 125 ML/HR: 50; 4.5; 1.49 INJECTION, SOLUTION INTRAVENOUS at 02:04

## 2017-05-23 RX ADMIN — PROPOFOL 20 MG: 10 INJECTION, EMULSION INTRAVENOUS at 13:36

## 2017-05-23 NOTE — ROUTINE PROCESS
TRANSFER - OUT REPORT:    Verbal report given to Marlon Palmer on Td Gowers  being transferred to ER for routine progression of care       Report consisted of patients Situation, Background, Assessment and   Recommendations(SBAR). Information from the following report(s) Procedure Summary was reviewed with the receiving nurse. Lines:   Peripheral IV 05/23/17 Right Arm (Active)   Site Assessment Clean, dry, & intact 5/23/2017  1:46 PM   Phlebitis Assessment 0 5/23/2017  1:46 PM   Infiltration Assessment 0 5/23/2017  1:46 PM   Dressing Status Clean, dry, & intact 5/23/2017  1:46 PM   Dressing Type Transparent;Tape 5/23/2017  1:46 PM   Hub Color/Line Status Blue; Infusing 5/23/2017  1:46 PM        Opportunity for questions and clarification was provided.       Patient transported with:   Registered Nurse

## 2017-05-23 NOTE — ED NOTES
Two chairs given to patient's additional two visitors. Three visitors currently at bedside for patient comfort.

## 2017-05-23 NOTE — ANESTHESIA PREPROCEDURE EVALUATION
Anesthetic History   No history of anesthetic complications            Review of Systems / Medical History  Patient summary reviewed and pertinent labs reviewed    Pulmonary  Within defined limits                 Neuro/Psych   Within defined limits    CVA      Comments: quadroplegic Cardiovascular  Within defined limits  Hypertension              Exercise tolerance: <4 METS     GI/Hepatic/Renal  Within defined limits   GERD           Endo/Other  Within defined limits      Arthritis     Other Findings   Comments:   Risk Factors for Postoperative nausea/vomiting:       History of postoperative nausea/vomiting? NO       Female? YES       Motion sickness? NO       Intended opioid administration for postoperative analgesia? NO      Smoking Abstinence  Current Smoker? NO  Elective Surgery? YES  Seen preoperatively by anesthesiologist or proxy prior to day of surgery? YES  Pt abstained from smoking 24 hours prior to anesthesia?  YES               Physical Exam    Airway  Mallampati: II  TM Distance: 4 - 6 cm  Neck ROM: normal range of motion   Mouth opening: Normal     Cardiovascular    Rhythm: regular  Rate: normal         Dental    Dentition: Poor dentition  Comments: No upper teeth   Pulmonary  Breath sounds clear to auscultation               Abdominal  GI exam deferred       Other Findings            Anesthetic Plan    ASA: 4  Anesthesia type: MAC          Induction: Intravenous  Anesthetic plan and risks discussed with: Patient

## 2017-05-23 NOTE — PROGRESS NOTES
completed the initial Spiritual Assessment of the patient in bed 7 of the emergency room, and offered Pastoral Care support. Patient did not speak during the visit but her family did. Patient does not have any Yazidism/cultural needs that will affect patients preferences in health care.    Chaplains will continue to follow and will provide pastoral care on an as needed/requested basis     Chaplain Haim Bronson   Board Certified [address] [place] [address]   [phone]

## 2017-05-23 NOTE — ED NOTES
I have reviewed discharge instructions with patient and family (daughter Miley Beltran). Daughter verbalized understanding and has no further questions at this time. Education taught and patient verbalized understanding of education. Teach back method used. Patients pain 0/10 at time of discharge. Belongings given to patient. Patient discharged with family to home.

## 2017-05-23 NOTE — ED NOTES
Assumed care of pt from Dr. Calvin Baig, pt awaiting GI for replacement of PEG tube.     2PM. Pt returned from endoscopy, stable for dc home.

## 2017-05-23 NOTE — ED NOTES
Assumed care of patient, received report from [facility]. Patient resting in bed on monitors, female visitor at bedside. Patient complains of abdominal pain at this time.

## 2017-05-23 NOTE — ED NOTES
Attempt per Mahesh House PA to reinsert peg tube. Patient tolerates well, but unable to advanced to previous depth. Dr. Black Bryan also attempts without success.

## 2017-05-23 NOTE — CONSULTS
Gastroenterology Consult    Patient: Ananda Chin MRN: [id number]  SSN: [id number]    YOB: 1975  Age: 39 y.o. Sex: female        Assessment:   -Oropharyngeal dysphagia; s/p PEG tube placement several years ago; PEG dislodged  -H/O CVA    38 y/o F with chronic oropharyngeal dysphagia with h/o stroke several years ago with aphasia and hemiplegia and chronic PEG for nutritional supplementation; s/p inadvertent dislodgement of PEG yesterday evening. Unable to be re-placed at bedside. She does eat some but not enough to maintain nutrition and needs ongoing PEG for nutritional support. I was unable to advance an 18Fr balloon replacement PEG through the stoma deep enough to ensure it was in the gastric lumen. May be some stenosis since the PEG was removed. I recommended EGD for direct visualization. May need wire placement and dilation of the tract or placement of a pull-technique PEG. If completely unable to access the tract, will consider new puncture for PEG placement. Discussed with patient and family at bedside and they agreed to proceed    Plan:   -NPO  -Plan for EGD this afternoon for PEG replacement or placement; possible stomal dilation    Subjective:      Ananda Chin is a 39 y.o. female who is being seen for dislodgement of her PEG. She has a h/o CVA several years ago with subsequent hemiparesis and aphasia as well as oropharyngeal dysphagia. SHe had a PEG placed several years ago and has been using it regularly without problems. She recently had it replaced with the current tube. Yesterday evening, she was being moved at home and the PEG was inadvertently dislodged. They came to ER for evaluation and a new PEG (18Fr balloon replacement PEG) was not able to be placed through the stoma and GI was contacted. She has some pain around the stoma site but otherwise feels well. She does not speak but nods yes and no appropriately to questions.  I do not have her prior PEG tube available for evaluation, but her family reports it was the same size as the 18Fr which was attempted to be re-placed through the stoma. CBC and metabolic panel are unremarkable. Past Medical History  Past Medical History:   Diagnosis Date    Arthritis     osteoarthritis    Chronic bronchitis (HCC)     GERD (gastroesophageal reflux disease)     Hypertension     Quadriplegia ([address]    -h/o CVA    Past Surgical History  Past Surgical History:   Procedure Laterality Date    HX TRACHEOSTOMY     -h/o PEG    Family History  History reviewed. No pertinent family history. Social History  Social History     Social History    Marital status: LEGALLY      Spouse name: N/A    Number of children: N/A    Years of education: N/A     Occupational History    Not on file. Social History Main Topics    Smoking status: Former Smoker    Smokeless tobacco: Never Used    Alcohol use No    Drug use: No    Sexual activity: Not on file     Other Topics Concern    Not on file     Social History Narrative         Medications  Current Facility-Administered Medications   Medication Dose Route Frequency    dextrose 5% - 0.45% NaCl with KCl 20 mEq/L infusion  125 mL/hr IntraVENous CONTINUOUS     Current Outpatient Prescriptions   Medication Sig    albuterol (PROVENTIL HFA, VENTOLIN HFA, PROAIR HFA) 90 mcg/actuation inhaler Take 2 Puffs by inhalation every four (4) hours as needed for Wheezing.  potassium chloride (KLOR-CON) 25 mEq pack Take 1 Packet by mouth two (2) times daily (with meals).  amLODIPine (NORVASC) 10 mg tablet Take 10 mg by mouth daily.  carvedilol (COREG) 25 mg tablet Take 25 mg by mouth daily.  chlorthalidone (HYGROTEN) 50 mg tablet Take 50 mg by mouth daily.  levETIRAcetam (KEPPRA) 750 mg tablet Take 750 mg by mouth two (2) times a day.  lidocaine (LIDODERM) 5 %(700 mg/patch) by TransDERmal route every twenty-four (24) hours.  Apply patch to the affected area for 12 hours a day and remove for 12 hours a day.  Polyethylene Glycol 3350 powd by Does Not Apply route. Hospital Problems  Date Reviewed: 3/27/2015    None        No Known Allergies    Review of Systems:  Pertinent items are noted in the History of Present Illness. Objective:     Physical Exam:  Visit Vitals    /80    Pulse 66    Temp 98.6 °F (37 °C)    Resp 14    SpO2 100%     General appearance: alert, cooperative, no distress, appears stated age  Head: Normocephalic, without obvious abnormality, atraumatic  Eyes: negative for icterus or pallor  Throat: Lips, mucosa, and tongue normal. Poor dentition; and gums normal  Neck: supple, symmetrical, trachea midline, no adenopathy, thyroid: not enlarged, symmetric, no tenderness/mass/nodules  Lungs: clear to auscultation bilaterally  Heart: regular rate and rhythm, S1, S2 normal, no murmur, click, rub or gallop  Abdomen: soft, non-tender. Bowel sounds normal. No masses,  no organomegaly; Ostomy in LUQ with small amount of blood around site. I was unable to advance the 18Fr replacement PEG further than the superficial abdominal wall.    Extremities: extremities normal, atraumatic, no cyanosis or edema  Skin: Skin color, texture, turgor normal. No rashes or lesions  Neurologic: Aphasia; appropriate responses (yes and no with head shake) to questions, left sided hemiparesis    Recent Results (from the past 24 hour(s))   METABOLIC PANEL, BASIC    Collection Time: 05/23/17  1:00 AM   Result Value Ref Range    Sodium 137 136 - 145 mmol/L    Potassium 3.0 (L) 3.5 - 5.5 mmol/L    Chloride 101 100 - 108 mmol/L    CO2 28 21 - 32 mmol/L    Anion gap 8 3.0 - 18 mmol/L    Glucose 86 74 - 99 mg/dL    BUN 13 7.0 - 18 MG/DL    Creatinine 0.68 0.6 - 1.3 MG/DL    BUN/Creatinine ratio 19 12 - 20      GFR est AA >60 >60 ml/min/1.73m2    GFR est non-AA >60 >60 ml/min/1.73m2    Calcium 9.1 8.5 - 10.1 MG/DL   CBC WITH AUTOMATED DIFF    Collection Time: 05/23/17  1:00 AM   Result Value Ref Range    WBC 5.5 4.6 - 13.2 K/uL    RBC 4.31 4.20 - 5.30 M/uL    HGB 13.5 12.0 - 16.0 g/dL    HCT 39.6 35.0 - 45.0 %    MCV 91.9 74.0 - 97.0 FL    MCH 31.3 24.0 - 34.0 PG    MCHC 34.1 31.0 - 37.0 g/dL    RDW 12.8 11.6 - 14.5 %    PLATELET 528 057 - 804 K/uL    MPV 9.2 9.2 - 11.8 FL    NEUTROPHILS 50 40 - 73 %    LYMPHOCYTES 39 21 - 52 %    MONOCYTES 8 3 - 10 %    EOSINOPHILS 2 0 - 5 %    BASOPHILS 1 0 - 2 %    ABS. NEUTROPHILS 2.8 1.8 - 8.0 K/UL    ABS. LYMPHOCYTES 2.1 0.9 - 3.6 K/UL    ABS. MONOCYTES 0.4 0.05 - 1.2 K/UL    ABS. EOSINOPHILS 0.1 0.0 - 0.4 K/UL    ABS.  BASOPHILS 0.1 (H) 0.0 - 0.06 K/UL    RBC COMMENTS NORMOCYTIC, NORMOCHROMIC      DF AUTOMATED           Signed By: Melecio Valdivia MD     May 23, 2017

## 2017-05-23 NOTE — ED PROVIDER NOTES
HPI 39 YOF here for PEG tube coming out. Apparently patient was rolled by family and it accidentally came out tonight. She is not here for anything else tonight. Patient is not verbal, but she follows commands. Past Medical History:   Diagnosis Date    Arthritis     osteoarthritis    Chronic bronchitis (HCC)     GERD (gastroesophageal reflux disease)     Hypertension     Quadriplegia ([address]        Past Surgical History:   Procedure Laterality Date    HX TRACHEOSTOMY           History reviewed. No pertinent family history. Social History     Social History    Marital status: LEGALLY      Spouse name: N/A    Number of children: N/A    Years of education: N/A     Occupational History    Not on file. Social History Main Topics    Smoking status: Former Smoker    Smokeless tobacco: Never Used    Alcohol use No    Drug use: No    Sexual activity: Not on file     Other Topics Concern    Not on file     Social History Narrative         ALLERGIES: Review of patient's allergies indicates no known allergies. Review of Systems   Unable to perform ROS: Patient nonverbal       Vitals:    05/22/17 2134   BP: 128/89   Pulse: 66   Resp: 14   Temp: 98.6 °F (37 °C)   SpO2: 100%            Physical Exam   Constitutional: She appears well-developed and well-nourished. No distress. HENT:   Head: Normocephalic and atraumatic. Right Ear: External ear normal.   Left Ear: External ear normal.   Nose: Nose normal.   Mouth/Throat: Oropharynx is clear and moist.   Eyes: Conjunctivae and EOM are normal. Pupils are equal, round, and reactive to light. Neck: Normal range of motion. Neck supple. Cardiovascular: Normal rate, regular rhythm and normal heart sounds. Pulmonary/Chest: Effort normal and breath sounds normal.   Abdominal: Soft. Bowel sounds are normal. She exhibits no distension. Musculoskeletal: She exhibits no deformity. Neurological: She is alert. Skin: Skin is warm and dry. She is not diaphoretic. No pallor. Psychiatric:   Healed scar anterior neck, PEG tube hole noted with some minimal blood noted at exit of tub, no active bleeding. PEG tub is out at this time. Nursing note and vitals reviewed. MDM  Number of Diagnoses or Management Options  Hypokalemia:   PEG tube malfunction [facility]):   Diagnosis management comments: Family is at bedside, I discussed the need to try and put the tube back, family agrees, I have given one attempt so far to place the tube back, it will not go all the way in at this time, discussed with attending, admit. Consult with Dr. Isamar Suazo, we discussed the case, keep NPO, he will take care of patient this am.  For now, low K, we will start correcting that in the ED. Amount and/or Complexity of Data Reviewed  Clinical lab tests: ordered and reviewed    Risk of Complications, Morbidity, and/or Mortality  Presenting problems: moderate  Diagnostic procedures: moderate  Management options: moderate    Patient Progress  Patient progress: stable    ED Course       Procedures     Labs Reviewed   METABOLIC PANEL, BASIC - Abnormal; Notable for the following:        Result Value    Potassium 3.0 (*)     All other components within normal limits   CBC WITH AUTOMATED DIFF - Abnormal; Notable for the following:     ABS. BASOPHILS 0.1 (*)     All other components within normal limits                   ICD-10-CM ICD-9-CM   1. PEG tube malfunction (HCC) K94.23 536.42   2. Hypokalemia E87.6 276.8        Plan: Admit to GI lab. For now, keep in ER.

## 2017-05-23 NOTE — ED NOTES
Patient's mother states,\"I want her to get fixed with that tube or we gonna take her out of here with a ambulance. I ain't for all this nonsense. We ain't putting up with you all people. \" Explained plan of care to patient's mother, offered support. Notified [phone] in endoscopy of mother's concerns, [phone] states  [facility] in a procedure at this time and will notify him.

## 2017-05-23 NOTE — PROCEDURES
Endoscopy Procedure Note    Patient: Justus Cook MRN: [id number]  SSN: [id number]    YOB: 1975  Age: 39 y.o. Sex: female      Date/Time:  5/23/2017 1:44 PM    Esophagogastroduodenoscopy (EGD) with PEG Tube Placement Procedure Note    Procedure: Esophagogastroduodenoscopy with placement of a percutaneous endoscopic gastrostomy tube    IMPRESSION:   1. -Small hiatal hernia, mild esophagitis  2. -Normal upper endoscopy, with no endoscopic evidence of neoplasia or mucosal abnormality. 3. -Successful PEG tube replacement. RECOMMENDATIONS:  1. Typical peg tube care   2. Can use tube for medications and fluids now, Can start tube feeds in 6 hours. Indication: Dyphagia/odynophagia  :  Tami Leon MD  Assistants: Endoscopy Technician-2: Beau Menjivar; Glenys Landrum  Endoscopy RN-2: Iasac Cedeño RN; Tay Cobos RN    Referring Provider:   Delbert Zacarias MD  History: The history and physical exam were reviewed and updated. Endoscope: Olympus [id number]-190 diagnostic endoscope  Extent of Exam: third portion of the duodenum  ASA: See anesthesia report  Anethesia/Sedation:  MAC anesthesia    Description of the procedure: The procedure was discussed with the patient including risks, benefits, alternatives including risks of iv sedation, bleeding, perforation and aspiration. A safety timeout was performed. The patient was placed in the left lateral decubitus position. A bite block was placed. The patient was given incremental doses of intravenous sedation until moderate sedation was achieved. The patients vital signs were monitored at all times including heart rate/rhythm, blood pressure and oxygen saturation. The endoscope was then passed under direct visualization to the third portion of the duodenum. The endoscope was then slowly withdrawn while visualizing the mucosa. In the stomach a retroflexion was performed and gastric fundus and cardia visualized.   The endoscope was then slowly withdrawn. After placement of the PEG, the endoscope was then passed again to the stomach. The bumper location was noted. The endoscope was removed. The patient was then transferred to recovery in stable condition. Findings:    Esophagus: There was a small (1cm) hiatal hernia with mild esophagitis noted. Otherwise, the esophageal mucosa was normal with no ulceration, mass or stricture. There was no evidence of Peter's esophagus or reflux esophagitis. Stomach: The gastric mucosa was normal aside from the prior PEG stoma, with no ulceration, mass, stricture. In the anterior wall o the gastric body, the prior PEG stoma was visualized; it was patent but narrowed. Bubbling from endoscopic insufflation was noted externally confirming patent gastrocutaneous fistula. The 18Fr replacement PEG with deflated balloon was initially attempted to be again placed through the stoma without success. A savary wire was not able to be passed through the stoma so a flexible ERCP wire was placed successfully through the stoma and into the stomach. An 25 Western Debbie and then 24 Western Debbie dilator were passed sequentially over the wire for dilation and then the 18Fr replacement PEG was advanced over the wire but there was still significant resistance appreciated with trying to push the PEG into position so this approach was abandoned. A PEG placement kit was opened and the catheter (without needle) was then advanced over the wire and into the stomach. The ERCP wire was exchanged for the blue wire in the PEG kit and was captured with a snare from the endsocope and the scope and wire were removed from the mouth together. A pull-type 20Fr PEG was then attached to the wire and pulled through the mouth and out of the existing stoma without difficulty. Duodenum: The duodenum mucosa was normal with no ulceration, mass, stricture and no evidence of villous atrophy.      After completion of the endosci    Therapies:  Peg tube replacement. Specimens: * No specimens in log *            Complications:   None; patient tolerated the procedure well.     EBL:Minimal    Discharge disposition:  Return to the ER, then home in the company of  when recovered    Lemuel Jamison MD  May 23, 2017  1:44 PM

## 2017-05-23 NOTE — ANESTHESIA POSTPROCEDURE EVALUATION
Post-Anesthesia Evaluation and Assessment    Patient: Kvng Begum MRN: [id number]  SSN: [id number]    YOB: 1975  Age: 39 y.o. Sex: female       Cardiovascular Function/Vital Signs  Visit Vitals    BP (!) 135/93    Pulse 78    Temp 37 °C (98.6 °F)    Resp 20    SpO2 99%       Patient is status post MAC anesthesia for Procedure(s):  ESOPHAGOGASTRODUODENOSCOPY (EGD)  PERCUTANEOUS ENDOSCOPIC GASTROSTOMY TUBE INSERTION. Nausea/Vomiting: None    Postoperative hydration reviewed and adequate. Pain:  Pain Scale 1: Numeric (0 - 10) (05/23/17 1346)  Pain Intensity 1: 0 (05/23/17 1346)   Managed    Neurological Status:   Neuro  Neurologic State: Alert (05/23/17 1940)  Orientation Level: Appropriate for age (05/23/17 0426)  Cognition: Decreased attention/concentration; Follows commands;Poor safety awareness (05/23/17 0426)  Speech: Aphasic (comment) (nonverbal  residual deficit from previous brain injury) (05/23/17 0426)   At baseline    Mental Status and Level of Consciousness: Arousable    Pulmonary Status:   O2 Device: Room air (05/23/17 1348)   Adequate oxygenation and airway patent    Complications related to anesthesia: None    Post-anesthesia assessment completed.  No concerns    Signed By: Lexi Nuñez CRNA     May 23, 2017

## 2017-06-06 ENCOUNTER — APPOINTMENT (OUTPATIENT)
Dept: CT IMAGING | Age: 42
End: 2017-06-06
Attending: EMERGENCY MEDICINE
Payer: MEDICARE

## 2017-06-06 ENCOUNTER — HOSPITAL ENCOUNTER (EMERGENCY)
Age: 42
Discharge: HOME | End: 2017-06-06
Attending: EMERGENCY MEDICINE
Payer: MEDICARE

## 2017-06-06 ENCOUNTER — APPOINTMENT (OUTPATIENT)
Dept: GENERAL RADIOLOGY | Age: 42
End: 2017-06-06
Attending: EMERGENCY MEDICINE
Payer: MEDICARE

## 2017-06-06 VITALS
RESPIRATION RATE: 26 BRPM | TEMPERATURE: 97.7 F | DIASTOLIC BLOOD PRESSURE: 86 MMHG | BODY MASS INDEX: 44.63 KG/M2 | HEART RATE: 77 BPM | SYSTOLIC BLOOD PRESSURE: 122 MMHG | WEIGHT: 260 LBS | OXYGEN SATURATION: 99 %

## 2017-06-06 DIAGNOSIS — R07.9 ACUTE CHEST PAIN: Primary | ICD-10-CM

## 2017-06-06 LAB
ALBUMIN SERPL BCP-MCNC: 2.5 G/DL (ref 3.4–5)
ALBUMIN/GLOB SERPL: 0.4 {RATIO} (ref 0.8–1.7)
ALP SERPL-CCNC: 127 U/L (ref 45–117)
ALT SERPL-CCNC: 48 U/L (ref 13–56)
ANION GAP BLD CALC-SCNC: 9 MMOL/L (ref 3–18)
AST SERPL W P-5'-P-CCNC: 30 U/L (ref 15–37)
BASOPHILS # BLD AUTO: 0 K/UL (ref 0–0.06)
BASOPHILS # BLD: 1 % (ref 0–2)
BILIRUB SERPL-MCNC: 0.5 MG/DL (ref 0.2–1)
BUN SERPL-MCNC: 21 MG/DL (ref 7–18)
BUN/CREAT SERPL: 28 (ref 12–20)
CALCIUM SERPL-MCNC: 8.5 MG/DL (ref 8.5–10.1)
CHLORIDE SERPL-SCNC: 99 MMOL/L (ref 100–108)
CK MB CFR SERPL CALC: NORMAL % (ref 0–4)
CK MB CFR SERPL CALC: NORMAL % (ref 0–4)
CK MB SERPL-MCNC: <1 NG/ML (ref 5–25)
CK MB SERPL-MCNC: <1 NG/ML (ref 5–25)
CK SERPL-CCNC: 44 U/L (ref 26–192)
CK SERPL-CCNC: 61 U/L (ref 26–192)
CO2 SERPL-SCNC: 31 MMOL/L (ref 21–32)
CREAT SERPL-MCNC: 0.75 MG/DL (ref 0.6–1.3)
D DIMER PPP FEU-MCNC: 6.71 UG/ML(FEU)
DIFFERENTIAL METHOD BLD: ABNORMAL
EOSINOPHIL # BLD: 0.1 K/UL (ref 0–0.4)
EOSINOPHIL NFR BLD: 1 % (ref 0–5)
ERYTHROCYTE [DISTWIDTH] IN BLOOD BY AUTOMATED COUNT: 12.7 % (ref 11.6–14.5)
GLOBULIN SER CALC-MCNC: 5.7 G/DL (ref 2–4)
GLUCOSE SERPL-MCNC: 77 MG/DL (ref 74–99)
HCT VFR BLD AUTO: 39.2 % (ref 35–45)
HGB BLD-MCNC: 13.3 G/DL (ref 12–16)
LIPASE SERPL-CCNC: 159 U/L (ref 73–393)
LYMPHOCYTES # BLD AUTO: 21 % (ref 21–52)
LYMPHOCYTES # BLD: 1.6 K/UL (ref 0.9–3.6)
MCH RBC QN AUTO: 31.2 PG (ref 24–34)
MCHC RBC AUTO-ENTMCNC: 33.9 G/DL (ref 31–37)
MCV RBC AUTO: 92 FL (ref 74–97)
MONOCYTES # BLD: 0.8 K/UL (ref 0.05–1.2)
MONOCYTES NFR BLD AUTO: 11 % (ref 3–10)
NEUTS SEG # BLD: 5.2 K/UL (ref 1.8–8)
NEUTS SEG NFR BLD AUTO: 66 % (ref 40–73)
PLATELET # BLD AUTO: 376 K/UL (ref 135–420)
PMV BLD AUTO: 8.4 FL (ref 9.2–11.8)
POTASSIUM SERPL-SCNC: 3.5 MMOL/L (ref 3.5–5.5)
PROT SERPL-MCNC: 8.2 G/DL (ref 6.4–8.2)
RBC # BLD AUTO: 4.26 M/UL (ref 4.2–5.3)
SODIUM SERPL-SCNC: 139 MMOL/L (ref 136–145)
TROPONIN I BLD-MCNC: <0.04 NG/ML (ref 0–0.08)
TROPONIN I SERPL-MCNC: <0.02 NG/ML (ref 0–0.04)
TROPONIN I SERPL-MCNC: <0.02 NG/ML (ref 0–0.04)
WBC # BLD AUTO: 7.7 K/UL (ref 4.6–13.2)

## 2017-06-06 PROCEDURE — 71010 XR CHEST PORT: CPT

## 2017-06-06 PROCEDURE — 84484 ASSAY OF TROPONIN QUANT: CPT | Performed by: EMERGENCY MEDICINE

## 2017-06-06 PROCEDURE — 83690 ASSAY OF LIPASE: CPT | Performed by: EMERGENCY MEDICINE

## 2017-06-06 PROCEDURE — 74011250636 HC RX REV CODE- 250/636: Performed by: EMERGENCY MEDICINE

## 2017-06-06 PROCEDURE — 74011636320 HC RX REV CODE- 636/320: Performed by: EMERGENCY MEDICINE

## 2017-06-06 PROCEDURE — 96376 TX/PRO/DX INJ SAME DRUG ADON: CPT

## 2017-06-06 PROCEDURE — 96374 THER/PROPH/DIAG INJ IV PUSH: CPT

## 2017-06-06 PROCEDURE — 80053 COMPREHEN METABOLIC PANEL: CPT | Performed by: EMERGENCY MEDICINE

## 2017-06-06 PROCEDURE — 99285 EMERGENCY DEPT VISIT HI MDM: CPT

## 2017-06-06 PROCEDURE — 93005 ELECTROCARDIOGRAM TRACING: CPT

## 2017-06-06 PROCEDURE — 71275 CT ANGIOGRAPHY CHEST: CPT

## 2017-06-06 PROCEDURE — 74011000258 HC RX REV CODE- 258: Performed by: EMERGENCY MEDICINE

## 2017-06-06 PROCEDURE — 85379 FIBRIN DEGRADATION QUANT: CPT | Performed by: EMERGENCY MEDICINE

## 2017-06-06 PROCEDURE — 85025 COMPLETE CBC W/AUTO DIFF WBC: CPT | Performed by: EMERGENCY MEDICINE

## 2017-06-06 PROCEDURE — 82550 ASSAY OF CK (CPK): CPT | Performed by: EMERGENCY MEDICINE

## 2017-06-06 PROCEDURE — 96375 TX/PRO/DX INJ NEW DRUG ADDON: CPT

## 2017-06-06 RX ORDER — DIPHENHYDRAMINE HYDROCHLORIDE 50 MG/ML
25 INJECTION, SOLUTION INTRAMUSCULAR; INTRAVENOUS ONCE
Status: COMPLETED | OUTPATIENT
Start: 2017-06-06 | End: 2017-06-06

## 2017-06-06 RX ORDER — MORPHINE SULFATE 4 MG/ML
4 INJECTION, SOLUTION INTRAMUSCULAR; INTRAVENOUS
Status: COMPLETED | OUTPATIENT
Start: 2017-06-06 | End: 2017-06-06

## 2017-06-06 RX ORDER — SODIUM CHLORIDE 9 MG/ML
98 INJECTION, SOLUTION INTRAVENOUS CONTINUOUS
Status: DISCONTINUED | OUTPATIENT
Start: 2017-06-06 | End: 2017-06-06 | Stop reason: DRUGHIGH

## 2017-06-06 RX ORDER — ONDANSETRON 2 MG/ML
4 INJECTION INTRAMUSCULAR; INTRAVENOUS
Status: COMPLETED | OUTPATIENT
Start: 2017-06-06 | End: 2017-06-06

## 2017-06-06 RX ORDER — SODIUM CHLORIDE 9 MG/ML
98 INJECTION, SOLUTION INTRAVENOUS ONCE
Status: COMPLETED | OUTPATIENT
Start: 2017-06-06 | End: 2017-06-06

## 2017-06-06 RX ADMIN — IOPAMIDOL 72 ML: 755 INJECTION, SOLUTION INTRAVENOUS at 18:57

## 2017-06-06 RX ADMIN — SODIUM CHLORIDE 98 ML: 900 INJECTION, SOLUTION INTRAVENOUS at 18:57

## 2017-06-06 RX ADMIN — Medication 4 MG: at 20:15

## 2017-06-06 RX ADMIN — Medication 4 MG: at 17:25

## 2017-06-06 RX ADMIN — SODIUM CHLORIDE 98 ML: 900 INJECTION, SOLUTION INTRAVENOUS at 18:59

## 2017-06-06 RX ADMIN — ONDANSETRON 4 MG: 2 INJECTION INTRAMUSCULAR; INTRAVENOUS at 17:25

## 2017-06-06 RX ADMIN — DIPHENHYDRAMINE HYDROCHLORIDE 25 MG: 50 INJECTION, SOLUTION INTRAMUSCULAR; INTRAVENOUS at 20:20

## 2017-06-06 NOTE — ED PROVIDER NOTES
HPI Comments: 4:07 PM Franco Singer is a 39 y.o. female with h/o HTN, ruptured brain aneurysm, and stroke with residual left sided weakness who presents to ED via EMS for chest pain onset yesterday. Patient has also been experiencing shortness of breath. Family thinks it's gas. No vomiting, hiccupping, abdominal pain, or cough. Patient has had chills, diaphoresis, and constipation. LBM was yesterday. No other concerns or symptoms at this time. History is limited due to patient being nonverbal. Patient can shake head yes or no. Family at bedside to give history. PCP: Cristofer Pritchett MD    The history is provided by the patient. Past Medical History:   Diagnosis Date    Arthritis     osteoarthritis    Chronic bronchitis (HCC)     GERD (gastroesophageal reflux disease)     Hypertension     Quadriplegia ([address]        Past Surgical History:   Procedure Laterality Date    HX TRACHEOSTOMY           History reviewed. No pertinent family history. Social History     Social History    Marital status: LEGALLY      Spouse name: N/A    Number of children: N/A    Years of education: N/A     Occupational History    Not on file. Social History Main Topics    Smoking status: Former Smoker    Smokeless tobacco: Never Used    Alcohol use No    Drug use: No    Sexual activity: Not on file     Other Topics Concern    Not on file     Social History Narrative         ALLERGIES: Review of patient's allergies indicates no known allergies. Review of Systems   Unable to perform ROS: Patient nonverbal       Vitals:    06/06/17 1730 06/06/17 1745 06/06/17 1830 06/06/17 2012   BP:    (!) 141/99   Pulse: 82 83 82 80   Resp: 29 26 30 26   Temp:       SpO2: 100% 100% 99%    Weight:                Physical Exam   Constitutional: She is oriented to person, place, and time. She appears well-developed and well-nourished. No distress. HENT:   Head: Normocephalic and atraumatic.    Mouth/Throat: Oropharynx is clear and moist.   Eyes: Conjunctivae and EOM are normal. Pupils are equal, round, and reactive to light. No scleral icterus. Neck: Normal range of motion. Neck supple. Cardiovascular: Normal rate, regular rhythm and normal heart sounds. No murmur heard. Pulses:       Dorsalis pedis pulses are 2+ on the right side, and 2+ on the left side. Pulmonary/Chest: Effort normal and breath sounds normal. No respiratory distress. She exhibits tenderness (mild). Abdominal: Soft. Bowel sounds are normal. She exhibits no distension. There is no tenderness. Musculoskeletal: She exhibits no edema. Right lower leg: She exhibits no tenderness and no edema. Left lower leg: She exhibits no tenderness and no edema. Lymphadenopathy:     She has no cervical adenopathy. Neurological: She is alert and oriented to person, place, and time. Coordination normal.   Left facial droop. Aphasic. Mild left hemiparesis. 3/5 strength left upper and lower extremities. Skin: Skin is warm and dry. No rash noted. Psychiatric: She has a normal mood and affect. Her behavior is normal.   Nursing note and vitals reviewed.        MDM  Number of Diagnoses or Management Options  Acute chest pain:   Diagnosis management comments: Mid sternal Chest pain in with mild chest wall tenderness h/o ICH from aneurysm per pt chronic L hemipariesis    Ekg: nsr rate 88 nonspecific st changes unchanged from previous    D dimer elevated  Awaiting CTA chest with repeat poc trop       Amount and/or Complexity of Data Reviewed  Clinical lab tests: ordered and reviewed  Tests in the radiology section of CPT®: ordered  Independent visualization of images, tracings, or specimens: yes    Risk of Complications, Morbidity, and/or Mortality  Presenting problems: high  Diagnostic procedures: moderate  Management options: moderate    Patient Progress  Patient progress: stable    ED Course       Other Procedure  Date/Time: 6/6/2017 5:55 PM  Performed by: Manuel Carcamo  Authorized by: Manuel Carcamo     Consent:     Consent obtained:  Verbal    Consent given by:  Patient  Indications:     Indications:  IV access  Pre-procedure details:     Procedure prep: alcohol wipe. Preparation: Patient was prepped and draped in the usual sterile fashion    Anesthesia (see MAR for exact dosages): Anesthesia method:  None  Post-procedure details:     Patient tolerance of procedure: Tolerated well, no immediate complications  Comments:      Ultrasound guided IV access 20 gauge Diffusics in right arm with good blood flow. Vitals:  Patient Vitals for the past 12 hrs:   Temp Pulse Resp BP SpO2   06/06/17 2012 - 80 26 (!) 141/99 -   06/06/17 1830 - 82 30 - 99 %   06/06/17 1745 - 83 26 - 100 %   06/06/17 1730 - 82 29 - 100 %   06/06/17 1715 - 83 26 125/87 100 %   06/06/17 1700 - 84 30 128/90 100 %   06/06/17 1645 - 83 24 (!) 132/91 100 %   06/06/17 1630 - 81 28 128/90 100 %   06/06/17 1615 - 81 27 123/87 100 %   06/06/17 1600 - 82 28 120/83 100 %   06/06/17 1545 - 82 28 (!) 108/92 100 %   06/06/17 1530 - 83 29 113/77 100 %   06/06/17 1517 - 89 18 - 100 %   06/06/17 1515 97.7 °F (36.5 °C) - - 105/74 -   SpO2 reviewed and within normal limits.      Medications ordered:   Medications   diphenhydrAMINE (BENADRYL) injection 25 mg (not administered)   morphine injection 4 mg (4 mg IntraVENous Given 6/6/17 1725)   ondansetron (ZOFRAN) injection 4 mg (4 mg IntraVENous Given 6/6/17 1725)   iopamidol (ISOVUE-370) 76 % injection 72 mL (72 mL IntraVENous Given 6/6/17 1857)   0.9% sodium chloride infusion 98 mL (0 mL IntraVENous IV Completed 6/6/17 1900)   morphine injection 4 mg (4 mg IntraVENous Given 6/6/17 2015)         Lab findings:  Recent Results (from the past 12 hour(s))   EKG, 12 LEAD, INITIAL    Collection Time: 06/06/17  3:24 PM   Result Value Ref Range    Ventricular Rate 88 BPM    Atrial Rate 88 BPM    P-R Interval 182 ms    QRS Duration 98 ms    Q-T Interval 384 ms QTC Calculation (Bezet) 464 ms    Calculated P Axis 22 degrees    Calculated R Axis -12 degrees    Calculated T Axis -11 degrees    Diagnosis       Normal sinus rhythm  Moderate voltage criteria for LVH, may be normal variant  Nonspecific T wave abnormality  Prolonged QT  Abnormal ECG  When compared with ECG of 21-AUG-2015 19:58,  No significant change was found     CBC WITH AUTOMATED DIFF    Collection Time: 06/06/17  4:17 PM   Result Value Ref Range    WBC 7.7 4.6 - 13.2 K/uL    RBC 4.26 4.20 - 5.30 M/uL    HGB 13.3 12.0 - 16.0 g/dL    HCT 39.2 35.0 - 45.0 %    MCV 92.0 74.0 - 97.0 FL    MCH 31.2 24.0 - 34.0 PG    MCHC 33.9 31.0 - 37.0 g/dL    RDW 12.7 11.6 - 14.5 %    PLATELET 611 746 - 888 K/uL    MPV 8.4 (L) 9.2 - 11.8 FL    NEUTROPHILS 66 40 - 73 %    LYMPHOCYTES 21 21 - 52 %    MONOCYTES 11 (H) 3 - 10 %    EOSINOPHILS 1 0 - 5 %    BASOPHILS 1 0 - 2 %    ABS. NEUTROPHILS 5.2 1.8 - 8.0 K/UL    ABS. LYMPHOCYTES 1.6 0.9 - 3.6 K/UL    ABS. MONOCYTES 0.8 0.05 - 1.2 K/UL    ABS. EOSINOPHILS 0.1 0.0 - 0.4 K/UL    ABS. BASOPHILS 0.0 0.0 - 0.06 K/UL    DF AUTOMATED     METABOLIC PANEL, COMPREHENSIVE    Collection Time: 06/06/17  4:17 PM   Result Value Ref Range    Sodium 139 136 - 145 mmol/L    Potassium 3.5 3.5 - 5.5 mmol/L    Chloride 99 (L) 100 - 108 mmol/L    CO2 31 21 - 32 mmol/L    Anion gap 9 3.0 - 18 mmol/L    Glucose 77 74 - 99 mg/dL    BUN 21 (H) 7.0 - 18 MG/DL    Creatinine 0.75 0.6 - 1.3 MG/DL    BUN/Creatinine ratio 28 (H) 12 - 20      GFR est AA >60 >60 ml/min/1.73m2    GFR est non-AA >60 >60 ml/min/1.73m2    Calcium 8.5 8.5 - 10.1 MG/DL    Bilirubin, total 0.5 0.2 - 1.0 MG/DL    ALT (SGPT) 48 13 - 56 U/L    AST (SGOT) 30 15 - 37 U/L    Alk.  phosphatase 127 (H) 45 - 117 U/L    Protein, total 8.2 6.4 - 8.2 g/dL    Albumin 2.5 (L) 3.4 - 5.0 g/dL    Globulin 5.7 (H) 2.0 - 4.0 g/dL    A-G Ratio 0.4 (L) 0.8 - 1.7     CARDIAC PANEL,(CK, CKMB & TROPONIN)    Collection Time: 06/06/17  4:17 PM   Result Value Ref Range    CK 61 26 - 192 U/L    CK - MB <1.0 <3.6 ng/ml    CK-MB Index Cannot be calulated 0.0 - 4.0 %    Troponin-I, Qt. <0.02 0.0 - 0.045 NG/ML   D DIMER    Collection Time: 06/06/17  4:17 PM   Result Value Ref Range    D DIMER 6.71 (H) <0.46 ug/ml(FEU)   LIPASE    Collection Time: 06/06/17  4:17 PM   Result Value Ref Range    Lipase 159 73 - 393 U/L   POC TROPONIN-I    Collection Time: 06/06/17  7:50 PM   Result Value Ref Range    Troponin-I (POC) <0.04 0.00 - 0.08 ng/mL       X-Ray, CT or other radiology findings or impressions:  XR CHEST PORT   Final Result   IMPRESSION:     Underexpanded lungs with right basilar atelectasis versus airspace disease. Interpreted by radiologist 4:28 PM       Orders:  Orders Placed This Encounter    XR CHEST PORT     Standing Status:   Standing     Number of Occurrences:   1     Order Specific Question:   Reason for Exam     Answer:   chest pain     Order Specific Question:   Is Patient Pregnant? Answer:   Unknown    CTA CHEST W OR W WO CONT     Standing Status:   Standing     Number of Occurrences:   1     Order Specific Question:   Transport     Answer:   Dimas [5]     Order Specific Question:   Is Patient Pregnant? Answer:   No     Order Specific Question:   Is Patient Allergic to Contrast Dye? Answer:   No     Order Specific Question:   Does patient have history of Renal Disease?      Answer:   No    CBC WITH AUTOMATED DIFF     Standing Status:   Standing     Number of Occurrences:   1    METABOLIC PANEL, COMPREHENSIVE     Standing Status:   Standing     Number of Occurrences:   1    CARDIAC PANEL,(CK, CKMB & TROPONIN)     Standing Status:   Standing     Number of Occurrences:   1    D DIMER     Standing Status:   Standing     Number of Occurrences:   1    LIPASE     Standing Status:   Standing     Number of Occurrences:   1    CARDIAC PANEL,(CK, CKMB & TROPONIN)     Standing Status:   Standing     Number of Occurrences:   1    OTHER PROCEDURE (ASAP ONLY)     This order was created via procedure documentation     Standing Status:   Standing     Number of Occurrences:   1    POC TROPONIN-I     Standing Status:   Standing     Number of Occurrences:   1    POC TROPONIN-I     Standing Status:   Standing     Number of Occurrences:   1    EKG, 12 LEAD, INITIAL     Standing Status:   Standing     Number of Occurrences:   1     Order Specific Question:   Reason for Exam:     Answer:   CP    morphine injection 4 mg    ondansetron (ZOFRAN) injection 4 mg    iopamidol (ISOVUE-370) 76 % injection 72 mL    DISCONTD: 0.9% sodium chloride infusion 98 mL    0.9% sodium chloride infusion 98 mL    morphine injection 4 mg    diphenhydrAMINE (BENADRYL) injection 25 mg       Reevaluation, Progress notes, Consult notes, or additional Procedure notes:   7:01 PM : Pt care transferred to Dr. Heath Infante  ,ED provider. History of patient complaint(s), available diagnostic reports and current treatment plan has been discussed thoroughly. Bedside rounding on patient occured : yes . Intended disposition of patient : TBD  Pending diagnostics reports and/or labs (please list): CTA chest    Disposition:  Diagnosis:   1. Acute chest pain        Disposition: Pending    Follow-up Information     None           Patient's Medications   Start Taking    No medications on file   Continue Taking    ALBUTEROL (PROVENTIL HFA, VENTOLIN HFA, PROAIR HFA) 90 MCG/ACTUATION INHALER    Take 2 Puffs by inhalation every four (4) hours as needed for Wheezing. AMLODIPINE (NORVASC) 10 MG TABLET    Take 10 mg by mouth daily. CARVEDILOL (COREG) 25 MG TABLET    Take 25 mg by mouth daily. CHLORTHALIDONE (HYGROTEN) 50 MG TABLET    Take 50 mg by mouth daily. LEVETIRACETAM (KEPPRA) 750 MG TABLET    Take 750 mg by mouth two (2) times a day. LIDOCAINE (LIDODERM) 5 %(700 MG/PATCH)    by TransDERmal route every twenty-four (24) hours.  Apply patch to the affected area for 12 hours a day and remove for 12 hours a day. POLYETHYLENE GLYCOL 3350 POWD    by Does Not Apply route. POTASSIUM CHLORIDE (KLOR-CON) 25 MEQ PACK    Take 1 Packet by mouth two (2) times daily (with meals). These Medications have changed    No medications on file   Stop Taking    No medications on file         [id number] [facility] for and in the presence of Suze Tate MD (06/06/17)      Physician Attestation  I personally performed the services described in this documentation, reviewed and edited the documentation which was dictated to the scribe in my presence, and it accurately records my words and actions. Suze Tate MD (06/06/17)      Signed by: Precious Baxter, June 06, 2017 at 8:22 PM     Note:  Assuming care of patient at beginning of shift    7:26 PM  I, Fernando Morrow MD, assumed care of patient at the beginning of my shift. Current Facility-Administered Medications   Medication Dose Route Frequency    diphenhydrAMINE (BENADRYL) injection 25 mg  25 mg IntraVENous ONCE     Current Outpatient Prescriptions   Medication Sig    albuterol (PROVENTIL HFA, VENTOLIN HFA, PROAIR HFA) 90 mcg/actuation inhaler Take 2 Puffs by inhalation every four (4) hours as needed for Wheezing.  potassium chloride (KLOR-CON) 25 mEq pack Take 1 Packet by mouth two (2) times daily (with meals).  amLODIPine (NORVASC) 10 mg tablet Take 10 mg by mouth daily.  carvedilol (COREG) 25 mg tablet Take 25 mg by mouth daily.  chlorthalidone (HYGROTEN) 50 mg tablet Take 50 mg by mouth daily.  levETIRAcetam (KEPPRA) 750 mg tablet Take 750 mg by mouth two (2) times a day.  lidocaine (LIDODERM) 5 %(700 mg/patch) by TransDERmal route every twenty-four (24) hours. Apply patch to the affected area for 12 hours a day and remove for 12 hours a day.  Polyethylene Glycol 3350 powd by Does Not Apply route.        Past Medical History:   Diagnosis Date    Arthritis     osteoarthritis    [facility] [address] ([facility] [address]     GERD (gastroesophageal reflux disease)     Hypertension     Quadri[address]gia ([facility]        Past Surgical History:   Procedure Laterality Date    HX TRACHEOSTOMY         History reviewed. No pertinent family history. Social History     Social History    Marital status: LEGALLY      Spouse name: N/A    Number of children: N/A    Years of education: N/A     Occupational History    Not on file. Social History Main Topics    Smoking status: Former Smoker    Smokeless tobacco: Never Used    Alcohol use No    Drug use: No    Sexual activity: Not on file     Other Topics Concern    Not on file     Social History Narrative       No Known Allergies    Patient's primary care provider (as noted in EPIC):  Lieutenant Tbaatha MD    Abnormal lab results from this emergency department encounter:  Labs Reviewed   CBC WITH AUTOMATED DIFF - Abnormal; Notable for the following:        Result Value    MPV 8.4 (*)     MONOCYTES 11 (*)     All other components within normal limits   METABOLIC PANEL, COMPREHENSIVE - Abnormal; Notable for the following:     Chloride 99 (*)     BUN 21 (*)     BUN/Creatinine ratio 28 (*)     Alk.  phosphatase 127 (*)     Albumin 2.5 (*)     Globulin 5.7 (*)     A-G Ratio 0.4 (*)     All other components within normal limits   D DIMER - Abnormal; Notable for the following:     D DIMER 6.71 (*)     All other components within normal limits   CARDIAC PANEL,(CK, CKMB & TROPONIN)   LIPASE   CARDIAC PANEL,(CK, CKMB & TROPONIN)   POC TROPONIN-I   POC TROPONIN-I       Lab values for this patient within approximately the last 12 hours:  Recent Results (from the past 12 hour(s))   EKG, 12 LEAD, INITIAL    Collection Time: 06/06/17  3:24 PM   Result Value Ref Range    Ventricular Rate 88 BPM    Atrial Rate 88 BPM    P-R Interval 182 ms    QRS Duration 98 ms    Q-T Interval 384 ms    QTC Calculation (Bezet) 464 ms    Calculated P Axis 22 degrees    Calculated R Axis -12 degrees Calculated T Axis -11 degrees    Diagnosis       Normal sinus rhythm  Moderate voltage criteria for LVH, may be normal variant  Nonspecific T wave abnormality  Prolonged QT  Abnormal ECG  When compared with ECG of 21-AUG-2015 19:58,  No significant change was found     CBC WITH AUTOMATED DIFF    Collection Time: 06/06/17  4:17 PM   Result Value Ref Range    WBC 7.7 4.6 - 13.2 K/uL    RBC 4.26 4.20 - 5.30 M/uL    HGB 13.3 12.0 - 16.0 g/dL    HCT 39.2 35.0 - 45.0 %    MCV 92.0 74.0 - 97.0 FL    MCH 31.2 24.0 - 34.0 PG    MCHC 33.9 31.0 - 37.0 g/dL    RDW 12.7 11.6 - 14.5 %    PLATELET 625 168 - 997 K/uL    MPV 8.4 (L) 9.2 - 11.8 FL    NEUTROPHILS 66 40 - 73 %    LYMPHOCYTES 21 21 - 52 %    MONOCYTES 11 (H) 3 - 10 %    EOSINOPHILS 1 0 - 5 %    BASOPHILS 1 0 - 2 %    ABS. NEUTROPHILS 5.2 1.8 - 8.0 K/UL    ABS. LYMPHOCYTES 1.6 0.9 - 3.6 K/UL    ABS. MONOCYTES 0.8 0.05 - 1.2 K/UL    ABS. EOSINOPHILS 0.1 0.0 - 0.4 K/UL    ABS. BASOPHILS 0.0 0.0 - 0.06 K/UL    DF AUTOMATED     METABOLIC PANEL, COMPREHENSIVE    Collection Time: 06/06/17  4:17 PM   Result Value Ref Range    Sodium 139 136 - 145 mmol/L    Potassium 3.5 3.5 - 5.5 mmol/L    Chloride 99 (L) 100 - 108 mmol/L    CO2 31 21 - 32 mmol/L    Anion gap 9 3.0 - 18 mmol/L    Glucose 77 74 - 99 mg/dL    BUN 21 (H) 7.0 - 18 MG/DL    Creatinine 0.75 0.6 - 1.3 MG/DL    BUN/Creatinine ratio 28 (H) 12 - 20      GFR est AA >60 >60 ml/min/1.73m2    GFR est non-AA >60 >60 ml/min/1.73m2    Calcium 8.5 8.5 - 10.1 MG/DL    Bilirubin, total 0.5 0.2 - 1.0 MG/DL    ALT (SGPT) 48 13 - 56 U/L    AST (SGOT) 30 15 - 37 U/L    Alk.  phosphatase 127 (H) 45 - 117 U/L    Protein, total 8.2 6.4 - 8.2 g/dL    Albumin 2.5 (L) 3.4 - 5.0 g/dL    Globulin 5.7 (H) 2.0 - 4.0 g/dL    A-G Ratio 0.4 (L) 0.8 - 1.7     CARDIAC PANEL,(CK, CKMB & TROPONIN)    Collection Time: 06/06/17  4:17 PM   Result Value Ref Range    CK 61 26 - 192 U/L    CK - MB <1.0 <3.6 ng/ml    CK-MB Index Cannot be calulated 0.0 - 4.0 %    Troponin-I, Qt. <0.02 0.0 - 0.045 NG/ML   D DIMER    Collection Time: 06/06/17  4:17 PM   Result Value Ref Range    D DIMER 6.71 (H) <0.46 ug/ml(FEU)   LIPASE    Collection Time: 06/06/17  4:17 PM   Result Value Ref Range    Lipase 159 73 - 393 U/L   POC TROPONIN-I    Collection Time: 06/06/17  7:50 PM   Result Value Ref Range    Troponin-I (POC) <0.04 0.00 - 0.08 ng/mL       Radiologist and cardiologist interpretations if available at time of this note:  Radiology results:  Xr Chest Port    Result Date: 6/6/2017  Chest, single view Indication: Chest pain Comparison: Several prior exams, most recently 10/17/2015. Findings:  Portable upright AP view of the chest was obtained. The lungs are underexpanded. Patchy opacity present at the right lung base. No pneumothorax or pleural effusion. Stable cardiac size and mediastinal contours. No acute osseous abnormality. IMPRESSION: Underexpanded lungs with right basilar atelectasis versus airspace disease. Medication(s) ordered for patient during this emergency visit encounter:  Medications   diphenhydrAMINE (BENADRYL) injection 25 mg (not administered)   morphine injection 4 mg (4 mg IntraVENous Given 6/6/17 1725)   ondansetron (ZOFRAN) injection 4 mg (4 mg IntraVENous Given 6/6/17 1725)   iopamidol (ISOVUE-370) 76 % injection 72 mL (72 mL IntraVENous Given 6/6/17 1857)   0.9% sodium chloride infusion 98 mL (0 mL IntraVENous IV Completed 6/6/17 1900)   morphine injection 4 mg (4 mg IntraVENous Given 6/6/17 2015)       Pt care assumed from Dr. Wicho Rae , ED provider. Pt complaint(s), current treatment plan, progression and available diagnostic results have been discussed thoroughly. Rounding occurred: yes  Intended Disposition: TBD   Pending diagnostic reports and/or labs (please list): CTA chest and second set of cardiac enzymes. 8:22 PM  Second troponin is negative. Diagnoses:  1.  Chest pain    SPECIFIC PATIENT INSTRUCTIONS FROM THE PHYSICIAN WHO TREATED YOU IN THE ER TODAY:  1. Return if worse. 2. Follow up with your primary doctor and cardiologist (if you have one) in the next 1-2 days for reevaluation. Calvin Duffy M.D.

## 2017-06-07 LAB
ATRIAL RATE: 88 BPM
CALCULATED P AXIS, ECG09: 22 DEGREES
CALCULATED R AXIS, ECG10: -12 DEGREES
CALCULATED T AXIS, ECG11: -11 DEGREES
DIAGNOSIS, 93000: NORMAL
P-R INTERVAL, ECG05: 182 MS
Q-T INTERVAL, ECG07: 384 MS
QRS DURATION, ECG06: 98 MS
QTC CALCULATION (BEZET), ECG08: 464 MS
VENTRICULAR RATE, ECG03: 88 BPM

## 2017-06-07 NOTE — ED NOTES
CTA of Chest Impression: No main or significant PE. Small right pleural infusion.  Per Jose Angel Montes MD

## 2017-06-07 NOTE — ED NOTES
Pt being discharged home. Discharge instructions given, and pt expresses understanding. Discontinued IV and helped patient to gather belongings. Pt discharged with family member. Patient discharged home with [facility] transport.

## 2017-06-07 NOTE — DISCHARGE INSTRUCTIONS
SPECIFIC PATIENT INSTRUCTIONS FROM THE PHYSICIAN WHO TREATED YOU IN THE ER TODAY:  1. Return if worse. 2. Follow up with your primary doctor and cardiologist (if you have one) in the next 1-2 days for reevaluation. Chest Pain: Care Instructions  Your Care Instructions  There are many things that can cause chest pain. Some are not serious and will get better on their own in a few days. But some kinds of chest pain need more testing and treatment. Your doctor may have recommended a follow-up visit in the next 8 to 12 hours. If you are not getting better, you may need more tests or treatment. Even though your doctor has released you, you still need to watch for any problems. The doctor carefully checked you, but sometimes problems can develop later. If you have new symptoms or if your symptoms do not get better, get medical care right away. If you have worse or different chest pain or pressure that lasts more than 5 minutes or you passed out (lost consciousness), call 911 or seek other emergency help right away. A medical visit is only one step in your treatment. Even if you feel better, you still need to do what your doctor recommends, such as going to all suggested follow-up appointments and taking medicines exactly as directed. This will help you recover and help prevent future problems. How can you care for yourself at home? · Rest until you feel better. · Take your medicine exactly as prescribed. Call your doctor if you think you are having a problem with your medicine. · Do not drive after taking a prescription pain medicine. When should you call for help? Call 911 if:  · You passed out (lost consciousness). · You have severe difficulty breathing. · You have symptoms of a heart attack. These may include:  ¨ Chest pain or pressure, or a strange feeling in your chest.  ¨ Sweating. ¨ Shortness of breath. ¨ Nausea or vomiting.   ¨ Pain, pressure, or a strange feeling in your back, neck, jaw, or upper belly or in one or both shoulders or arms. ¨ Lightheadedness or sudden weakness. ¨ A fast or irregular heartbeat. After you call 911, the  may tell you to chew 1 adult-strength or 2 to 4 low-dose aspirin. Wait for an ambulance. Do not try to drive yourself. Call your doctor today if:  · You have any trouble breathing. · Your chest pain gets worse. · You are dizzy or lightheaded, or you feel like you may faint. · You are not getting better as expected. · You are having new or different chest pain. Where can you learn more? Go to http://eliezer-zuly.info/. Enter A120 in the search box to learn more about \"Chest Pain: Care Instructions. \"  Current as of: May 27, 2016  Content Version: 11.2  © 2679-5137 [employer]. Care instructions adapted under license by [employer] (which disclaims liability or warranty for this information). If you have questions about a medical condition or this instruction, always ask your healthcare professional. [address] any warranty or liability for your use of this information. [employer] Activation    Thank you for requesting access to [employer]. Please follow the instructions below to securely access and download your online medical record. [employer] allows you to send messages to your doctor, view your test results, renew your prescriptions, schedule appointments, and more. How Do I Sign Up? 1. In your internet browser, go to [email or web address]. 2. Click on the First Time User? Click Here link in the Sign In box. You will see the New Member Sign Up page. 3. Enter your [employer] Access Code exactly as it appears below. You will not need to use this code after youve completed the sign-up process. If you do not sign up before the expiration date, you must request a new code.     [employer] Access Code: [email or web address]  Expires: 9/4/2017  8:28 PM (This is the date your iPAYst access code will )    4. Enter the last four digits of your Social Security Number (xxxx) and Date of Birth (mm/dd/yyyy) as indicated and click Submit. You will be taken to the next sign-up page. 5. Create a Limeadet ID. This will be your iPAYst login ID and cannot be changed, so think of one that is secure and easy to remember. 6. Create a iPAYst password. You can change your password at any time. 7. Enter your Password Reset Question and Answer. This can be used at a later time if you forget your password. 8. Enter your e-mail address. You will receive e-mail notification when new information is available in [address] 9. Click Sign Up. You can now view and download portions of your medical record. 10. Click the Download Summary menu link to download a portable copy of your medical information. Additional Information    If you have questions, please visit the Frequently Asked Questions section of the iPAYst website at [email or web address] Remember, iPAYst is NOT to be used for urgent needs. For medical emergencies, dial 911.

## 2018-01-23 NOTE — PROGRESS NOTES
[facility] [address]  Ph: [phone]  Fax: [phone]    Speech Therapy Discharge Summary    Patient name: Cody Trevino Start of Care: 17   Referral source: [place], [place] : 1975   Medical/Treatment Diagnosis: Dysphagia [R13.10]  Stroke (cerebrum) ([facility] [address] [I63.9] Onset Date:2014     Prior Hospitalization: see medical history Provider#: [id number]   Medications: Verified on Patient Summary List    Comorbidities: Stoke, Chronic Cough  Prior Level of Function:independent    Visits from Start of Care: 1    Missed Visits: 0    Reporting Period : 17 to 17      ASSESSMENT: Patient participated in diagnostic therapy with AAC device, x1 session. SLP provided evaluation and report for device. Patient's device was able to be received and according to [address] representative had been shipped. SLP has tried multiple times to reach out to patient to follow up and schedule therapy for device set up/customization, however no returned calls. Should patient see you in office and request to return to [facility], please send with new order. Thank you. RECOMMENDATIONS:  [x]Discontinue therapy: []Patient has reached or is progressing toward set goals      [x]Patient is non-compliant or has abdicated      []Due to lack of appreciable progress towards set goals    [address] [facility] SLP 2018 10:48 AM    NOTE TO PHYSICIAN:  Please complete the following and fax to: [facility] at [facility] at [phone]    Retain this original for your records. If you are unable to process this request in 24 hours, please contact our office.      [] I have read the above report and request that my patient continue therapy with the following changes/special instructions:  [] I have read the above report and request that my patient be discharged from therapy    Physician's Signature:_____________________ Date:___________Time:__________

## 2018-03-01 ENCOUNTER — HOSPITAL ENCOUNTER (OUTPATIENT)
Dept: PHYSICAL THERAPY | Age: 43
End: 2018-03-01

## 2018-03-14 ENCOUNTER — HOSPITAL ENCOUNTER (OUTPATIENT)
Dept: PHYSICAL THERAPY | Age: 43
Discharge: HOME | End: 2018-03-14
Attending: FAMILY MEDICINE
Payer: MEDICARE

## 2018-03-14 ENCOUNTER — HOSPITAL ENCOUNTER (OUTPATIENT)
Dept: GENERAL RADIOLOGY | Age: 43
Discharge: HOME | End: 2018-03-14
Attending: FAMILY MEDICINE
Payer: MEDICARE

## 2018-03-14 DIAGNOSIS — R13.12 OROPHARYNGEAL DYSPHAGIA: ICD-10-CM

## 2018-03-14 PROCEDURE — G8998 SWALLOW D/C STATUS: HCPCS

## 2018-03-14 PROCEDURE — 74011000255 HC RX REV CODE- 255: Performed by: RADIOLOGY

## 2018-03-14 PROCEDURE — G8996 SWALLOW CURRENT STATUS: HCPCS

## 2018-03-14 PROCEDURE — 92611 MOTION FLUOROSCOPY/SWALLOW: CPT

## 2018-03-14 PROCEDURE — G8997 SWALLOW GOAL STATUS: HCPCS

## 2018-03-14 PROCEDURE — 74230 X-RAY XM SWLNG FUNCJ C+: CPT

## 2018-03-14 RX ADMIN — BARIUM SULFATE 15 ML: 400 PASTE ORAL at 11:03

## 2018-03-14 RX ADMIN — BARIUM SULFATE 30 ML: 0.81 POWDER, FOR SUSPENSION ORAL at 11:03

## 2018-03-14 RX ADMIN — BARIUM SULFATE 15 ML: 400 SUSPENSION ORAL at 11:04

## 2018-03-14 NOTE — PROGRESS NOTES
[facility] at [facility]  [address], [address] [place] [address]  Ph: [phone] Fax: [phone]       Outpatient Modified Barium Swallow Evaluation      Kaitlin Condon        Date: 3/14/2018   : 1975    PMHx: CVA s/p PEG    HISTORY:   Pt is a 41-year-old female referred for Modified Barium Swallow study to assess swallow integrity and rule-out aspiration. Pt with primary nutrition via PEG tube. Mother accompanied pt to radiology suite; reports pt consumes soft solids (ie macaroni and cheese; biscuits; honey-buns). Mother further reports pt does not consume liquids via po; only water through PEG. Radiologist: Kyree Garzon Procedures  [x] Lateral View   [] A-P View [] Scanned to level of Sternum    [x] Seated at 90 deg.   [] Other:    Presentation:   [x] Spoon   [] Cup   [] Straw   [] Syringe   [] Consecutive Swallows  [] Other:    Consistencies:   [x] Ba+ liquid   [x] Ba+ liquid (nectar)   [x] Ba+ liquid (honey)     [x] Ba+ pudding   [] Ba+ crunched cookie   [] Ba+ cookie   [x] Other: HONEY BUN coated with Ba+  Testing Discontinued: [] Due to:    Treatment Techniques Attempted     [] Head Turn: [] Right [] Left     [] Head Tilt: [] Right [] Left     [] Chin Down:  [] Thermal Sensitization:  [] Supraglottic Swallow:  [] Mendelson's Maneuver:  [] Other:    Results  Dysphagia Present:    [x] Yes  [] No    Ratings of Dysphagia:    [] Mild  [] Moderate [x] Severe    Stages of Breakdown:   [x] Oral      [x] Oral Preparatory [x] Pharyngeal   [] Esophageal    Aspiration: [x] Yes    [] No  [] At Risk  [x] Trace (<10%) [] Significant (>10%):     %  [] Penetration: Level of:   Cough: [] Yes      [x] No    Consistency Aspirated:  [x] Thin Liquid   [] Nectar   [] Honey   [] Pureed     [] Mech-soft  [] Solid    Motility Problems with:  [x] Lip Closure:   [x] Sucking:   [x] Mastication:   [x] Bolus Formation:   [x] Bolus Control:  [x] A-P Transport:  [x] Posterior Tongue Elevation:  [x] Swallow Response (delayed):  [] Velopharyngeal Closure:  [] Laryngeal Elevation:  [x] Laryngeal Adduction:  [] Cricopharyngeal Relaxation:  [] Esophageal Peristalsis:  [] Reflux:  [] Other:    Timing of Aspiration:  [] Before Swallow:  [x] During Swallow:  [] After Swallow:    Transit Time Delay:  [x] >1 Second  Oral  [x] >1 Second Pharyngeal  [] >20 Second Esophageal     Residuals:  [] Buccal Cavity   [] Velum/posterior pharyngeal wall  [x] Valleculae  [x] Pyriforms    Pain:  Pain start of study:0  Pain end of study:0    GCODESwallowing:  Swallow Current Status CL= 60-79%   Swallow Goal Status CL= 60-79%   Swallow D/C Status CL= 60-79%    The severity rating is based on the following outcomes:    DINA Noms - Swallow Level 3  8-point Penetration-Aspiration Scale Score:8  Clinical Judgement    RESULTS/RECOMMENDATIONS  MBS completed with SILENT aspiration on thin liquid presentation via spoon. Nectar-thick and honey-thick liquids via tsp without aspiration. Pudding without incident. Attempted solid trial provided by mother from home (honey bun). Pt minimal lingual involvement in oral prep phrase required SLP to remove honey bun from oral cavity after ~3 minutes. Patient with severe oral apraxia which severely limits oral phase of swallow across all consistencies. Patient with oral delay, premature spillage to the level of the pyriforms with swallow delay (~5 seconds). Decreased hyolaryngeal excursion resulting in decreased VF adduction. Pt with significant oral and pharyngeal residue s/p swallow; noted anterior spillage of bolus from oral cavity. Pt presents with severe oral/moderate pharyngeal dysphagia, as evidenced above, which places pt at high risk for aspiration. At this time, safest for puree solid, honey-thick liquid diet. SLP utilized video of study for visual feedback, education, and recommendations for pt/mother; verbalized comprehension.        Thank you for this referral,  Idalmis Dixon.  [address] [place] [address] [facility], [place], operated by [facility]  Office: [phone]  Pager: [phone]

## 2019-02-28 ENCOUNTER — HOSPITAL ENCOUNTER (OUTPATIENT)
Dept: GENERAL RADIOLOGY | Age: 44
Discharge: HOME | End: 2019-02-28
Payer: MEDICARE

## 2019-02-28 DIAGNOSIS — J18.9 PNEUMONIA, UNSPECIFIED ORGANISM: ICD-10-CM

## 2019-02-28 PROCEDURE — 71046 X-RAY EXAM CHEST 2 VIEWS: CPT

## 2019-03-04 ENCOUNTER — ANESTHESIA EVENT (OUTPATIENT)
Dept: ENDOSCOPY | Age: 44
End: 2019-03-04
Payer: MEDICARE

## 2019-03-04 RX ORDER — LANOLIN ALCOHOL/MO/W.PET/CERES
6 CREAM (GRAM) TOPICAL
COMMUNITY

## 2019-03-04 RX ORDER — LEVETIRACETAM 100 MG/ML
750 SOLUTION ORAL 2 TIMES DAILY
COMMUNITY

## 2019-03-04 RX ORDER — THERA TABS 400 MCG
1 TAB ORAL DAILY
COMMUNITY

## 2019-03-04 RX ORDER — GABAPENTIN 600 MG/1
600 TABLET ORAL 3 TIMES DAILY
COMMUNITY
End: 2020-01-19

## 2019-03-04 NOTE — PERIOP NOTES
PAT - SURGICAL PRE-ADMISSION INSTRUCTIONS 
 
NAME:  Yina Angeles                                                          TODAY'S DATE:  3/4/2019 SURGERY DATE:  3/5/2019                                  SURGERY ARRIVAL TIME:   0630 1. Do NOT eat or drink anything, including candy or gum, after MIDNIGHT on 03/04 , unless you have specific instructions from your Surgeon or Anesthesia Provider to do so. 2. No smoking on the day of surgery. 3. No alcohol 24 hours prior to the day of surgery. 4. No recreational drugs for one week prior to the day of surgery. 5. Leave all valuables, including money/purse, at home. 6. Remove all jewelry, nail polish, makeup (including mascara); no lotions, powders, deodorant, or perfume/cologne/after shave. 7. Glasses/Contact lenses and Dentures may be worn to the hospital.  They will be removed prior to surgery. 8. Call your doctor if symptoms of a cold or illness develop within 24 ours prior to surgery. 9. AN ADULT MUST DRIVE YOU HOME AFTER OUTPATIENT SURGERY. 10. If you are having an OUTPATIENT procedure, please make arrangements for a responsible adult to be with you for 24 hours after your surgery. 11. If you are admitted to the hospital, you will be assigned to a bed after surgery is complete. Normally a family member will not be able to see you until you are in your assigned bed. 15. Family is encouraged to accompany you to the hospital.  We ask visitors in the treatment area to be limited to ONE person at a time to ensure patient privacy. EXCEPTIONS WILL BE MADE AS NEEDED. 15. Children under 12 are discouraged from entering the treatment area and need to be supervised by an adult when in the waiting room. Special Instructions: Take these medications the morning of surgery with a sip of water:  NONE Patient Prep: 
 
shower with anti-bacterial soap These surgical instructions were reviewed with Mariah Peter during the PAT phone. Directions: On the morning of surgery, please go to the [address] [place] [address]. Enter the building from the [facility] entrance, 1st floor (next to the Emergency Room entrance). Take the elevator to the 2nd floor. Sign in at the Registration Desk. If you have any questions and/or concerns, please do not hesitate to call: 
(Prior to the day of surgery)  [facility] unit:  [phone] (Day of surgery)  [facility] unit:  [phone]

## 2019-03-05 ENCOUNTER — ANESTHESIA (OUTPATIENT)
Dept: ENDOSCOPY | Age: 44
End: 2019-03-05
Payer: MEDICARE

## 2019-03-05 ENCOUNTER — HOSPITAL ENCOUNTER (OUTPATIENT)
Age: 44
Setting detail: OUTPATIENT SURGERY
Discharge: HOME | End: 2019-03-05
Attending: INTERNAL MEDICINE | Admitting: INTERNAL MEDICINE
Payer: MEDICARE

## 2019-03-05 VITALS
TEMPERATURE: 97.4 F | SYSTOLIC BLOOD PRESSURE: 156 MMHG | RESPIRATION RATE: 18 BRPM | DIASTOLIC BLOOD PRESSURE: 112 MMHG | BODY MASS INDEX: 41.65 KG/M2 | HEART RATE: 77 BPM | OXYGEN SATURATION: 100 % | WEIGHT: 250 LBS | HEIGHT: 65 IN

## 2019-03-05 PROCEDURE — 74011250636 HC RX REV CODE- 250/636

## 2019-03-05 PROCEDURE — 76060000032 HC ANESTHESIA 0.5 TO 1 HR: Performed by: INTERNAL MEDICINE

## 2019-03-05 PROCEDURE — 77030012058: Performed by: INTERNAL MEDICINE

## 2019-03-05 PROCEDURE — 77030005122 HC CATH GASTMY PEG BSC -B: Performed by: INTERNAL MEDICINE

## 2019-03-05 PROCEDURE — 74011250636 HC RX REV CODE- 250/636: Performed by: NURSE ANESTHETIST, CERTIFIED REGISTERED

## 2019-03-05 PROCEDURE — 77030008727 HC TU G REPLMT BSC -B: Performed by: INTERNAL MEDICINE

## 2019-03-05 PROCEDURE — 76040000007: Performed by: INTERNAL MEDICINE

## 2019-03-05 RX ORDER — EPINEPHRINE 0.1 MG/ML
1 INJECTION INTRACARDIAC; INTRAVENOUS
Status: CANCELLED | OUTPATIENT
Start: 2019-03-05 | End: 2019-03-06

## 2019-03-05 RX ORDER — NALOXONE HYDROCHLORIDE 0.4 MG/ML
0.4 INJECTION, SOLUTION INTRAMUSCULAR; INTRAVENOUS; SUBCUTANEOUS
Status: CANCELLED | OUTPATIENT
Start: 2019-03-05 | End: 2019-03-05

## 2019-03-05 RX ORDER — FLUMAZENIL 0.1 MG/ML
0.2 INJECTION INTRAVENOUS
Status: CANCELLED | OUTPATIENT
Start: 2019-03-05 | End: 2019-03-05

## 2019-03-05 RX ORDER — SODIUM CHLORIDE 0.9 % (FLUSH) 0.9 %
5-40 SYRINGE (ML) INJECTION EVERY 8 HOURS
Status: CANCELLED | OUTPATIENT
Start: 2019-03-05

## 2019-03-05 RX ORDER — ATROPINE SULFATE 0.1 MG/ML
0.5 INJECTION INTRAVENOUS
Status: CANCELLED | OUTPATIENT
Start: 2019-03-05 | End: 2019-03-06

## 2019-03-05 RX ORDER — SODIUM CHLORIDE, SODIUM LACTATE, POTASSIUM CHLORIDE, CALCIUM CHLORIDE 600; 310; 30; 20 MG/100ML; MG/100ML; MG/100ML; MG/100ML
25 INJECTION, SOLUTION INTRAVENOUS CONTINUOUS
Status: DISCONTINUED | OUTPATIENT
Start: 2019-03-05 | End: 2019-03-05 | Stop reason: HOSPADM

## 2019-03-05 RX ORDER — LIDOCAINE HYDROCHLORIDE 20 MG/ML
INJECTION, SOLUTION EPIDURAL; INFILTRATION; INTRACAUDAL; PERINEURAL AS NEEDED
Status: DISCONTINUED | OUTPATIENT
Start: 2019-03-05 | End: 2019-03-05 | Stop reason: HOSPADM

## 2019-03-05 RX ORDER — LIDOCAINE HYDROCHLORIDE 10 MG/ML
0.1 INJECTION, SOLUTION EPIDURAL; INFILTRATION; INTRACAUDAL; PERINEURAL AS NEEDED
Status: DISCONTINUED | OUTPATIENT
Start: 2019-03-05 | End: 2019-03-05 | Stop reason: HOSPADM

## 2019-03-05 RX ORDER — PROPOFOL 10 MG/ML
INJECTION, EMULSION INTRAVENOUS AS NEEDED
Status: DISCONTINUED | OUTPATIENT
Start: 2019-03-05 | End: 2019-03-05 | Stop reason: HOSPADM

## 2019-03-05 RX ORDER — SODIUM CHLORIDE 0.9 % (FLUSH) 0.9 %
5-40 SYRINGE (ML) INJECTION AS NEEDED
Status: CANCELLED | OUTPATIENT
Start: 2019-03-05

## 2019-03-05 RX ORDER — DEXTROMETHORPHAN/PSEUDOEPHED 2.5-7.5/.8
1.2 DROPS ORAL
Status: CANCELLED | OUTPATIENT
Start: 2019-03-05

## 2019-03-05 RX ADMIN — PROPOFOL 10 MG: 10 INJECTION, EMULSION INTRAVENOUS at 10:20

## 2019-03-05 RX ADMIN — PROPOFOL 10 MG: 10 INJECTION, EMULSION INTRAVENOUS at 10:18

## 2019-03-05 RX ADMIN — LIDOCAINE HYDROCHLORIDE 50 MG: 20 INJECTION, SOLUTION EPIDURAL; INFILTRATION; INTRACAUDAL; PERINEURAL at 10:15

## 2019-03-05 RX ADMIN — LIDOCAINE HYDROCHLORIDE 50 MG: 20 INJECTION, SOLUTION EPIDURAL; INFILTRATION; INTRACAUDAL; PERINEURAL at 10:17

## 2019-03-05 RX ADMIN — PROPOFOL 10 MG: 10 INJECTION, EMULSION INTRAVENOUS at 10:16

## 2019-03-05 RX ADMIN — SODIUM CHLORIDE, SODIUM LACTATE, POTASSIUM CHLORIDE, AND CALCIUM CHLORIDE 25 ML/HR: 600; 310; 30; 20 INJECTION, SOLUTION INTRAVENOUS at 09:05

## 2019-03-05 RX ADMIN — PROPOFOL 10 MG: 10 INJECTION, EMULSION INTRAVENOUS at 10:19

## 2019-03-05 RX ADMIN — PROPOFOL 10 MG: 10 INJECTION, EMULSION INTRAVENOUS at 10:22

## 2019-03-05 RX ADMIN — PROPOFOL 20 MG: 10 INJECTION, EMULSION INTRAVENOUS at 10:07

## 2019-03-05 RX ADMIN — PROPOFOL 50 MG: 10 INJECTION, EMULSION INTRAVENOUS at 10:15

## 2019-03-05 RX ADMIN — PROPOFOL 10 MG: 10 INJECTION, EMULSION INTRAVENOUS at 10:17

## 2019-03-05 RX ADMIN — PROPOFOL 10 MG: 10 INJECTION, EMULSION INTRAVENOUS at 10:21

## 2019-03-05 NOTE — PERIOP NOTES
Dr. Kayla Bronson notified patient HCG test has not been completed, Dr. Kayla Bronson denies need for patient to have an HCG test for procedure.

## 2019-03-05 NOTE — PERIOP NOTES
Phase 2 Recovery Summary  Patient arrived to Phase 2 at 1038  Report received from Steven Espitia into discuss findings with patient and family at 65. Mother is at bedside with patient dressing patient. Called Dr. Yohannes Patiño regarding blood pressure. Blood pressure is stable and patient is able to discharge. Patient is awaiting transport scheduled for 1200. Parent states that she would like to wait in waiting room until transport returns. I went to check on patient and parent both are doing well, however still waiting for transport. Transport is set for 1430 at this time. Parent called and they are running behind. Called at [phone] and spoke to [facility] at [facility] [phone]. [facility] came at 12 to front  entrance and patient was not there so they left. Transport came a second time to ER entrance and states no one is there and left. Patient and parent are waiting in ER waiting room for . [facility] states transport will arrive between [id number]. Patient picked up at 1630. Vitals:    03/05/19 0748 03/05/19 0823 03/05/19 0916 03/05/19 1040   BP: (!) 128/95   (!) 160/115   Pulse: (!) 121   77   Resp:  19 18 18   Temp: 97.4 °F (36.3 °C)      SpO2: 100%   100%   Weight: 113.4 kg (250 lb)      Height: 5' 5\" (1.651 m)          Patient is able to communicate with mom. Patient is nonverbal.     Lines and Drains  Peripheral Intravenous Line:   Peripheral IV 03/05/19 Right External jugular (Active)   Site Assessment Clean, dry, & intact 3/5/2019 10:44 AM   Phlebitis Assessment 0 3/5/2019 10:44 AM   Infiltration Assessment 0 3/5/2019 10:44 AM   Dressing Status Clean, dry, & intact 3/5/2019 10:44 AM   Dressing Type Tape;Transparent 3/5/2019 10:44 AM   Hub Color/Line Status Pink; Infusing 3/5/2019 10:44 AM   Alcohol Cap Used Yes 3/5/2019 10:44 AM       Discharge discussed with mom. Mom verbalized understanding of all discussed. Patient discharged to home with mother at 36.    Anna Veras RN

## 2019-03-05 NOTE — ANESTHESIA POSTPROCEDURE EVALUATION
Procedure(s):  ESOPHAGOGASTRODUODENOSCOPY (EGD)  PERCUTANEOUS ENDOSCOPIC GASTROSTOMY TUBE replacement. Anesthesia Post Evaluation      Multimodal analgesia: multimodal analgesia not used between 6 hours prior to anesthesia start to PACU discharge  Patient location during evaluation: bedside  Patient participation: complete - patient cannot participate  Level of consciousness: awake  Pain management: adequate  Airway patency: patent  Anesthetic complications: no  Cardiovascular status: acceptable  Respiratory status: acceptable  Hydration status: acceptable  Post anesthesia nausea and vomiting:  none      Visit Vitals  BP (!) 156/112   Pulse 77   Temp 36.3 °C (97.4 °F)   Resp 18   Ht 5' 5\" (1.651 m)   Wt 113.4 kg (250 lb)   SpO2 100%   Breastfeeding?  No   BMI 41.60 kg/m²

## 2019-03-05 NOTE — PROCEDURES
Endoscopy Procedure Note    Patient: Mike Arechiga MRN: [id number]  SSN: [id number]    YOB: 1975  Age: 37 y.o. Sex: female      Date/Time:  3/5/2019 10:25 AM    Esophagogastroduodenoscopy (EGD) Procedure Note    Procedure: Esophagogastroduodenoscopy with Replacement of PEG tube    IMPRESSION:   1. Unremarkable EGD  2. Successful exchange of PEG tube to new 20Fr, balloon bumper, angled PEG    RECOMMENDATIONS:  1. Resume tube feeding today  2. Follow-up in 3 months    Indication: Abdominal pain at the PEG site  :  Tigist Mcdonald MD  Assistants: Endoscopy Technician-1: [id number]  Endoscopy RN-1: Hien Longoria RN    Referring Provider:   Serenity Maldonado MD  History: The history and physical exam were reviewed and updated. Endoscope: Olympus GIF-190 diagnostic endoscope  Extent of Exam: third portion of the duodenum  ASA: Per Anesthesia  Anethesia/Sedation:  MAC anesthesia    Description of the procedure: The procedure was discussed with the patient including risks, benefits, alternatives including risks of iv sedation, bleeding, perforation and aspiration. A safety timeout was performed. The patient was placed in the left lateral decubitus position. A bite block was placed. The patient was given incremental doses of intravenous sedation until moderate sedation was achieved. The patients vital signs were monitored at all times including heart rate/rhythm, blood pressure and oxygen saturation. The endoscope was then passed under direct visualization to the third portion of the duodenum. The endoscope was then slowly withdrawn while visualizing the mucosa. In the stomach a retroflexion was performed and gastric fundus and cardia visualized. The endoscope was then slowly withdrawn. The patient was then transferred to recovery in stable condition. Findings:    Esophagus: The esophageal mucosa was normal with no ulceration, mass or stricture.   There was no evidence of Peter's esophagus or reflux esophagitis. Stomach: The gastric mucosa was normal with no ulceration, mass, stricture. The PEG was in good position along the anterior body. No evidence of ulceration or erythema was seen at the bumper site. Duodenum: The duodenum mucosa was normal with no ulceration, mass, stricture and no evidence of villous atrophy. The existing bumper was deflated and the PEG was removed. A new 20Fr Jawbone angled balloon bumper PEG was inserted through the gastrostomy and the balloon was inflated with 6ml of water. Positioning was confirmed endoscopically. Therapies:  PEG exchange    Specimens: * No specimens in log *            Complications:   None; patient tolerated the procedure well.     EBL:Minimal    Discharge disposition:  Home in the company of  when able to ambulate    Delma Churchill MD  March 5, 2019  10:25 AM

## 2019-03-05 NOTE — ANESTHESIA PREPROCEDURE EVALUATION
Anesthetic History   No history of anesthetic complications            Review of Systems / Medical History  Patient summary reviewed and pertinent labs reviewed    Pulmonary  Within defined limits                 Neuro/Psych       CVA (left weakness.   speach)       Cardiovascular    Hypertension              Exercise tolerance: >4 METS     GI/Hepatic/Renal  Within defined limits              Endo/Other        Morbid obesity     Other Findings   Comments:                  Physical Exam    Airway  Mallampati: IV  TM Distance: 4 - 6 cm  Neck ROM: decreased range of motion, short neck   Mouth opening: Diminished (comment)     Cardiovascular  Regular rate and rhythm,  S1 and S2 normal,  no murmur, click, rub, or gallop  Rhythm: regular  Rate: normal         Dental  No notable dental hx       Pulmonary  Breath sounds clear to auscultation               Abdominal  GI exam deferred       Other Findings            Anesthetic Plan    ASA: 4  Anesthesia type: MAC          Induction: Intravenous  Anesthetic plan and risks discussed with: Patient

## 2019-03-05 NOTE — H&P
History and Physical    Nikki Thomas  1975  [id number]  [id number]    Pre-Procedure Diagnosis:  malfunction of peg tube      Evaluation of past illnesses, surgeries, or injuries:   YES  Past Medical History:   Diagnosis Date    Arthritis     osteoarthritis    [facility]     GERD (gastroesophageal reflux disease)     Hypertension     Morbid obesity ([facility]     Stroke ([facility]     Left Side weakness     Past Surgical History:   Procedure Laterality Date    HX TRACHEOSTOMY      Removed     IR GASTROSTOMY TUBE PLACEMENT         Allergies:  No Known Allergies    Previous reactions to sedation/analgesia? NO    Review of current medications, supplement, herbals and nutraceuticals complete:  YES  Current Facility-Administered Medications   Medication Dose Route Frequency Provider Last Rate Last Dose    lidocaine (PF) (XYLOCAINE) 10 mg/mL (1 %) injection 0.1 mL  0.1 mL SubCUTAneous PRN Samina Navarro CRNA        lactated Ringers infusion  25 mL/hr IntraVENous CONTINUOUS Samina Baron CRNA              Pertinent labs reviewed? YES    History of substance abuse? NO  History reviewed. No pertinent family history.   Social History     Socioeconomic History    Marital status: LEGALLY      Spouse name: Not on file    Number of children: Not on file    Years of education: Not on file    Highest education level: Not on file   Social Needs    Financial resource strain: Not on file    Food insecurity - worry: Not on file    Food insecurity - inability: Not on file    Transportation needs - medical: Not on file   FPSI needs - non-medical: Not on file   Occupational History    Not on file   Tobacco Use    Smoking status: Former Smoker     Last attempt to quit: 3/4/2014     Years since quittin.0    Smokeless tobacco: Never Used   Substance and Sexual Activity    Alcohol use: No    Drug use: No    Sexual activity: Not on file   Other Topics Concern    Not on file   Social History Narrative    Not on file       Cardiac Status:  WNL    Mental Status: history of stroke with aphasia, left sided weakness, oropharyngeal dysphagia    Pulmonary Status:  Pulmonary hypertension, COPD    NPO:  >4    Assessment/Impression: Abdominal pain at PEG site    Plan of treatment: EGD with exchange of PEG tube        Cesar Beard MD  3/5/2019  8:26 AM

## 2019-03-05 NOTE — DISCHARGE INSTRUCTIONS
Patient Discharge Instructions    Celso Isaac / [id number] : 1975    Admitted 3/5/2019 Discharged: 3/5/2019         IMPRESSION:   1. Unremarkable EGD  2. Successful exchange of PEG tube to new 20Fr, balloon bumper, angled PEG    RECOMMENDATIONS:  1. Resume tube feeding today  2. Follow-up in 3 months    Recommended Diet: Resume tube feeding today    Recommended Activity:    1. Do not drink alcohol, drive or operate machinery for 12 hours   2. Call if any fever, abdominal pain or bleeding noted. Signed By: Jareth Dugan MD     2019           DISCHARGE SUMMARY from Nurse    PATIENT INSTRUCTIONS:    After general anesthesia or intravenous sedation, for 24 hours or while taking prescription Narcotics:  · Limit your activities  · Do not drive and operate hazardous machinery  · Do not make important personal or business decisions  · Do  not drink alcoholic beverages  · If you have not urinated within 8 hours after discharge, please contact your surgeon on call. Report the following to your surgeon:  · Excessive pain, swelling, redness or odor of or around the surgical area  · Temperature over 100.5  · Nausea and vomiting lasting longer than 4 hours or if unable to take medications  · Any signs of decreased circulation or nerve impairment to extremity: change in color, persistent  numbness, tingling, coldness or increase pain  · Any questions    What to do at Home:  Recommended activity: Activity as tolerated and no driving for today,     These are general instructions for a healthy lifestyle:    No smoking/ No tobacco products/ Avoid exposure to second hand smoke  Surgeon General's Warning:  Quitting smoking now greatly reduces serious risk to your health.     Obesity, smoking, and sedentary lifestyle greatly increases your risk for illness    A healthy diet, regular physical exercise & weight monitoring are important for maintaining a healthy lifestyle    You may be retaining fluid if you have a history of heart failure or if you experience any of the following symptoms:  Weight gain of 3 pounds or more overnight or 5 pounds in a week, increased swelling in our hands or feet or shortness of breath while lying flat in bed. Please call your doctor as soon as you notice any of these symptoms; do not wait until your next office visit. Recognize signs and symptoms of STROKE:    F-face looks uneven    A-arms unable to move or move unevenly    S-speech slurred or non-existent    T-time-call 911 as soon as signs and symptoms begin-DO NOT go       Back to bed or wait to see if you get better-TIME IS BRAIN. Warning Signs of HEART ATTACK     Call 911 if you have these symptoms:   Chest discomfort. Most heart attacks involve discomfort in the center of the chest that lasts more than a few minutes, or that goes away and comes back. It can feel like uncomfortable pressure, squeezing, fullness, or pain.  Discomfort in other areas of the upper body. Symptoms can include pain or discomfort in one or both arms, the back, neck, jaw, or stomach.  Shortness of breath with or without chest discomfort.  Other signs may include breaking out in a cold sweat, nausea, or lightheadedness. Don't wait more than five minutes to call 911 - MINUTES MATTER! Fast action can save your life. Calling 911 is almost always the fastest way to get lifesaving treatment. Emergency Medical Services staff can begin treatment when they arrive -- up to an hour sooner than if someone gets to the hospital by car. The discharge information has been reviewed with the patient. The patient verbalized understanding. Discharge medications reviewed with the patient and appropriate educational materials and side effects teaching were provided. ___________________________________________________________________________________________________________________________________  Patient armband removed and given to patient to take home. Patient was informed of the privacy risks if armband lost or stolen

## 2019-03-05 NOTE — PERIOP NOTES
Pre-Op Summary    Pt arrived via medical transport and is oriented to time, place, person and situation. Patient nonverbal due to stroke in 2014. Patient with unsteady gait with wheelchair assistive devices. She is able to stand and pivot with 3 -4 person assist.     Visit Vitals  BP (!) 128/95 (BP 1 Location: Right arm, BP Patient Position: At rest)   Pulse (!) 121   Temp 97.4 °F (36.3 °C)   Resp (P) 19   Ht 5' 5\" (1.651 m)   Wt 113.4 kg (250 lb)   SpO2 100%   Breastfeeding? No   BMI 41.60 kg/m²       Peripheral IV located on Right antecubital . Placed by Dr. Oniel Jean via ultrasound. Attempts x 3. Patients belongings are located locked up on [facility]. Patient's point of contact is mother Autumn Wharton and their contact no. Is [phone]. They will be driven home by [employer] transport. They are to be picked up between 12 and 12:40pm. Phone # [phone]. Use ID # [id number]. Mom will be in the waiting room. They are able to receive medication information.

## 2019-03-05 NOTE — PERIOP NOTES
IV to right AC not working, Dr. Layne Lab at bedside placing EJ to right side. Patient tolerating well.

## 2019-03-05 NOTE — PERIOP NOTES
1038: Patient taken to recovery by this RN and CRNA, bedside report given to Leticia Finch RN. Patient stable and on monitor. SBAR completed.

## 2019-05-04 ENCOUNTER — APPOINTMENT (OUTPATIENT)
Dept: GENERAL RADIOLOGY | Age: 44
End: 2019-05-04
Attending: NURSE PRACTITIONER
Payer: MEDICARE

## 2019-05-04 ENCOUNTER — HOSPITAL ENCOUNTER (EMERGENCY)
Age: 44
Discharge: HOME | End: 2019-05-04
Attending: EMERGENCY MEDICINE | Admitting: EMERGENCY MEDICINE
Payer: MEDICARE

## 2019-05-04 ENCOUNTER — APPOINTMENT (OUTPATIENT)
Dept: GENERAL RADIOLOGY | Age: 44
End: 2019-05-04
Attending: EMERGENCY MEDICINE
Payer: MEDICARE

## 2019-05-04 VITALS
OXYGEN SATURATION: 99 % | BODY MASS INDEX: 35.82 KG/M2 | HEART RATE: 82 BPM | WEIGHT: 215 LBS | RESPIRATION RATE: 23 BRPM | TEMPERATURE: 98.6 F | SYSTOLIC BLOOD PRESSURE: 121 MMHG | HEIGHT: 65 IN | DIASTOLIC BLOOD PRESSURE: 81 MMHG

## 2019-05-04 DIAGNOSIS — R05.9 COUGH: ICD-10-CM

## 2019-05-04 DIAGNOSIS — J20.9 ACUTE BRONCHITIS, UNSPECIFIED ORGANISM: Primary | ICD-10-CM

## 2019-05-04 LAB
ANION GAP SERPL CALC-SCNC: 2 MMOL/L (ref 3–18)
APPEARANCE UR: CLEAR
BASOPHILS # BLD: 0 K/UL (ref 0–0.1)
BASOPHILS NFR BLD: 0 % (ref 0–2)
BILIRUB UR QL: NEGATIVE
BUN SERPL-MCNC: 26 MG/DL (ref 7–18)
BUN/CREAT SERPL: 36 (ref 12–20)
CALCIUM SERPL-MCNC: 8.8 MG/DL (ref 8.5–10.1)
CHLORIDE SERPL-SCNC: 111 MMOL/L (ref 100–108)
CO2 SERPL-SCNC: 31 MMOL/L (ref 21–32)
COLOR UR: ABNORMAL
CREAT SERPL-MCNC: 0.73 MG/DL (ref 0.6–1.3)
DIFFERENTIAL METHOD BLD: ABNORMAL
EOSINOPHIL # BLD: 0.1 K/UL (ref 0–0.4)
EOSINOPHIL NFR BLD: 2 % (ref 0–5)
ERYTHROCYTE [DISTWIDTH] IN BLOOD BY AUTOMATED COUNT: 12.2 % (ref 11.6–14.5)
GLUCOSE SERPL-MCNC: 140 MG/DL (ref 74–99)
GLUCOSE UR STRIP.AUTO-MCNC: NEGATIVE MG/DL
HCT VFR BLD AUTO: 39 % (ref 35–45)
HGB BLD-MCNC: 12.7 G/DL (ref 12–16)
HGB UR QL STRIP: NEGATIVE
KETONES UR QL STRIP.AUTO: ABNORMAL MG/DL
LEUKOCYTE ESTERASE UR QL STRIP.AUTO: NEGATIVE
LYMPHOCYTES # BLD: 1.2 K/UL (ref 0.9–3.6)
LYMPHOCYTES NFR BLD: 37 % (ref 21–52)
MCH RBC QN AUTO: 31.6 PG (ref 24–34)
MCHC RBC AUTO-ENTMCNC: 32.6 G/DL (ref 31–37)
MCV RBC AUTO: 97 FL (ref 74–97)
MONOCYTES # BLD: 0.3 K/UL (ref 0.05–1.2)
MONOCYTES NFR BLD: 10 % (ref 3–10)
NEUTS SEG # BLD: 1.6 K/UL (ref 1.8–8)
NEUTS SEG NFR BLD: 51 % (ref 40–73)
NITRITE UR QL STRIP.AUTO: NEGATIVE
PH UR STRIP: 7.5 [PH] (ref 5–8)
PLATELET # BLD AUTO: 261 K/UL (ref 135–420)
PMV BLD AUTO: 9.3 FL (ref 9.2–11.8)
POTASSIUM SERPL-SCNC: 3.3 MMOL/L (ref 3.5–5.5)
PROT UR STRIP-MCNC: NEGATIVE MG/DL
RBC # BLD AUTO: 4.02 M/UL (ref 4.2–5.3)
SODIUM SERPL-SCNC: 144 MMOL/L (ref 136–145)
SP GR UR REFRACTOMETRY: 1.03 (ref 1–1.03)
UROBILINOGEN UR QL STRIP.AUTO: 1 EU/DL (ref 0.2–1)
WBC # BLD AUTO: 3.3 K/UL (ref 4.6–13.2)

## 2019-05-04 PROCEDURE — 80048 BASIC METABOLIC PNL TOTAL CA: CPT

## 2019-05-04 PROCEDURE — 74011250636 HC RX REV CODE- 250/636: Performed by: NURSE PRACTITIONER

## 2019-05-04 PROCEDURE — 96366 THER/PROPH/DIAG IV INF ADDON: CPT

## 2019-05-04 PROCEDURE — 74011250637 HC RX REV CODE- 250/637: Performed by: NURSE PRACTITIONER

## 2019-05-04 PROCEDURE — 96361 HYDRATE IV INFUSION ADD-ON: CPT

## 2019-05-04 PROCEDURE — 99285 EMERGENCY DEPT VISIT HI MDM: CPT

## 2019-05-04 PROCEDURE — 71046 X-RAY EXAM CHEST 2 VIEWS: CPT

## 2019-05-04 PROCEDURE — 77030005563 HC CATH URETH INT MMGH -A

## 2019-05-04 PROCEDURE — 51701 INSERT BLADDER CATHETER: CPT

## 2019-05-04 PROCEDURE — 81003 URINALYSIS AUTO W/O SCOPE: CPT

## 2019-05-04 PROCEDURE — 85025 COMPLETE CBC W/AUTO DIFF WBC: CPT

## 2019-05-04 PROCEDURE — 96365 THER/PROPH/DIAG IV INF INIT: CPT

## 2019-05-04 RX ORDER — BENZONATATE 100 MG/1
100 CAPSULE ORAL
Qty: 20 CAP | Refills: 0 | Status: SHIPPED | OUTPATIENT
Start: 2019-05-04 | End: 2020-01-19

## 2019-05-04 RX ORDER — AZITHROMYCIN 250 MG/1
TABLET, FILM COATED ORAL
Qty: 4 TAB | Refills: 0 | Status: SHIPPED | OUTPATIENT
Start: 2019-05-04 | End: 2019-05-09

## 2019-05-04 RX ORDER — SODIUM CHLORIDE 9 MG/ML
75 INJECTION, SOLUTION INTRAVENOUS CONTINUOUS
Status: DISCONTINUED | OUTPATIENT
Start: 2019-05-04 | End: 2019-05-05 | Stop reason: HOSPADM

## 2019-05-04 RX ORDER — POTASSIUM CHLORIDE 20 MEQ/1
40 TABLET, EXTENDED RELEASE ORAL
Status: DISCONTINUED | OUTPATIENT
Start: 2019-05-04 | End: 2019-05-04

## 2019-05-04 RX ORDER — POTASSIUM CHLORIDE 1.5 G/1.77G
40 POWDER, FOR SOLUTION ORAL
Status: COMPLETED | OUTPATIENT
Start: 2019-05-04 | End: 2019-05-04

## 2019-05-04 RX ORDER — IBUPROFEN 400 MG/1
400 TABLET ORAL
Qty: 20 TAB | Refills: 0 | Status: SHIPPED | OUTPATIENT
Start: 2019-05-04

## 2019-05-04 RX ADMIN — POTASSIUM CHLORIDE 40 MEQ: 1.5 POWDER, FOR SOLUTION ORAL at 21:10

## 2019-05-04 RX ADMIN — SODIUM CHLORIDE 75 ML/HR: 900 INJECTION, SOLUTION INTRAVENOUS at 17:39

## 2019-05-04 RX ADMIN — SODIUM CHLORIDE 500 MG: 900 INJECTION, SOLUTION INTRAVENOUS at 19:05

## 2019-05-04 NOTE — DISCHARGE INSTRUCTIONS
[employer] Activation    Thank you for requesting access to [employer]. Please follow the instructions below to securely access and download your online medical record. [employer] allows you to send messages to your doctor, view your test results, renew your prescriptions, schedule appointments, and more. How Do I Sign Up? 1. In your internet browser, go to [email or web address]  2. Click on the First Time User? Click Here link in the Sign In box. You will be redirect to the New Member Sign Up page. 3. Enter your [employer] Access Code exactly as it appears below. You will not need to use this code after youve completed the sign-up process. If you do not sign up before the expiration date, you must request a new code. [employer] Access Code: [email or web address]  Expires: 2019  3:24 PM (This is the date your [employer] access code will )    4. Enter the last four digits of your Social Security Number (xxxx) and Date of Birth (mm/dd/yyyy) as indicated and click Submit. You will be taken to the next sign-up page. 5. Create a [employer] ID. This will be your [employer] login ID and cannot be changed, so think of one that is secure and easy to remember. 6. Create a [employer] password. You can change your password at any time. 7. Enter your Password Reset Question and Answer. This can be used at a later time if you forget your password. 8. Enter your e-mail address. You will receive e-mail notification when new information is available in [address]. 9. Click Sign Up. You can now view and download portions of your medical record. 10. Click the Download Summary menu link to download a portable copy of your medical information. Additional Information    If you have questions, please visit the Frequently Asked Questions section of the [employer] website at [email or web address] Remember, [employer] is NOT to be used for urgent needs. For medical emergencies, dial 911.     Follow up with Dr Rosie Kenyon for further evaluation and treatment.

## 2019-05-04 NOTE — ED PROVIDER NOTES
[facility] is as 37year old female who was bourght in by her mother for a cough and congestion. Mother states the last time she had a cough like this, she was diagnosed with pneumonia. Child is mentally challenged. The symptoms have been ongoing for several days. Past Medical History:  
Diagnosis Date  Arthritis   
 osteoarthritis  Chronic bronchitis ([facility] [address]  GERD (gastroesophageal reflux disease)  Hypertension  Morbid obesity ([facility] [address]  Stroke [facility]) 2014 Left Side weakness Past Surgical History:  
Procedure Laterality Date  HX TRACHEOSTOMY Removed   IR GASTROSTOMY TUBE PLACEMENT  2014 History reviewed. No pertinent family history. Social History Socioeconomic History  Marital status: LEGALLY  Spouse name: Not on file  Number of children: Not on file  Years of education: Not on file  Highest education level: Not on file Occupational History  Not on file Social Needs  Financial resource strain: Not on file  Food insecurity:  
  Worry: Not on file Inability: Not on file  Transportation needs:  
  Medical: Not on file Non-medical: Not on file Tobacco Use  Smoking status: Former Smoker Last attempt to quit: 3/4/2014 Years since quittin.1  Smokeless tobacco: Never Used Substance and Sexual Activity  Alcohol use: No  
 Drug use: No  
 Sexual activity: Not on file Lifestyle  Physical activity:  
  Days per week: Not on file Minutes per session: Not on file  Stress: Not on file Relationships  Social connections:  
  Talks on phone: Not on file Gets together: Not on file Attends Confucianism service: Not on file Active member of club or organization: Not on file Attends meetings of clubs or organizations: Not on file Relationship status: Not on file  Intimate partner violence:  
  Fear of current or ex partner: Not on file Emotionally abused: Not on file Physically abused: Not on file Forced sexual activity: Not on file Other Topics Concern  Not on file Social History Narrative  Not on file ALLERGIES: Patient has no known allergies. Review of Systems Constitutional: Negative. HENT: Negative. Eyes: Negative. Respiratory: Negative. Cardiovascular: Negative. Gastrointestinal: Negative. Endocrine: Negative. Genitourinary: Negative. Musculoskeletal: Negative. Skin: Negative. Allergic/Immunologic: Negative. Neurological: Negative. Hematological: Negative. Psychiatric/Behavioral: Negative. Vitals:  
 05/04/19 1815 05/04/19 1830 05/04/19 1845 05/04/19 1900 BP: 131/78 121/81 127/87 124/84 Pulse: 78 76 85 83 Resp:      
Temp:      
SpO2: 98% 99% 97% 97% Weight:      
Height:      
      
 
Physical Exam  
Constitutional: She is oriented to person, place, and time. She appears well-developed and well-nourished. No distress. HENT:  
Head: Normocephalic and atraumatic. Nose: Nose normal.  
Eyes: EOM are normal. Right eye exhibits no discharge. Left eye exhibits no discharge. No scleral icterus. Neck: Normal range of motion. Neck supple. No tracheal deviation present. Cardiovascular: Normal rate, regular rhythm, normal heart sounds and intact distal pulses. Pulmonary/Chest: Effort normal and breath sounds normal.  
Occasional cough noted. Abdominal: Soft. She exhibits no distension. Genitourinary:  
Genitourinary Comments: NE 
  
Musculoskeletal: She exhibits no deformity. Neurological: She is alert and oriented to person, place, and time. Coordination normal.  
Skin: Skin is warm and dry. NE. Psychiatric: She has a normal mood and affect. Her behavior is normal.  
Nursing note and vitals reviewed. MDM Number of Diagnoses or Management Options Diagnosis management comments: PROGRESS NOTE:  CXR was reviewed.    There is inflammation of the distal airways. Will place on Z Marbin for bronchitis. Jey Roper NP  7:52 PM 
 
 
 
  
Amount and/or Complexity of Data Reviewed Clinical lab tests: ordered and reviewed Tests in the radiology section of CPT®: ordered and reviewed Procedures Diagnosis: 1. Acute bronchitis, unspecified organism 2. Cough Disposition:   Discharged to Home Follow-up Information Follow up With Specialties Details Why Contact Info Cecilia Bueno MD [facility] Call to arrange follow up    [address] [place] [id number] 
[phone] Patient's Medications Start Taking AZITHROMYCIN (ZITHROMAX Z-MARBIN) 250 MG TABLET    Take one tab po daily for four days. Start on 05-05-19 BENZONATATE (TESSALON PERLES) 100 MG CAPSULE    Take 1 Cap by mouth two (2) times daily as needed for Cough for up to 20 doses. IBUPROFEN (MOTRIN) 400 MG TABLET    Take 1 Tab by mouth every six (6) hours as needed for Pain. Continue Taking ALBUTEROL (PROVENTIL HFA, VENTOLIN HFA, PROAIR HFA) 90 MCG/ACTUATION INHALER    Take 2 Puffs by inhalation every four (4) hours as needed for Wheezing. AMLODIPINE (NORVASC) 10 MG TABLET    Take 10 mg by mouth daily. CARVEDILOL (COREG) 25 MG TABLET    Take 25 mg by mouth two (2) times a day. GABAPENTIN (NEURONTIN) 600 MG TABLET    Take 600 mg by mouth three (3) times daily. LEVETIRACETAM (KEPPRA) 500 MG/5 ML (5 ML) SOLUTION    Take 1,000 mg by mouth two (2) times daily (after meals). MELATONIN 3 MG TABLET    Take 6 mg by mouth nightly. THERAPEUTIC MULTIVITAMIN (THERAGRAN) TABLET    Take 1 Tab by mouth daily. These Medications have changed No medications on file Stop Taking No medications on file

## 2019-05-04 NOTE — ED NOTES
Per mother the patient has had congestion, an increased need to suction secretions and cough since yesterday.

## 2019-05-04 NOTE — ED NOTES
Pt nonverbal but able to shake head and nod yes or no to questions. Pt nods yes to chest pain and sob. Caregiver states increased congestion and chest pain since yesterday.

## 2019-05-05 NOTE — ED NOTES
I have reviewed discharge instructions with the patients mom. The patients mom verbalized understanding. Patient armband removed and shredded. 3 prescriptions given. All questions answered. Pt d/c'd to home via [address] [place] [address]

## 2020-01-19 ENCOUNTER — HOSPITAL ENCOUNTER (OUTPATIENT)
Age: 45
Setting detail: OBSERVATION
Discharge: HOME | End: 2020-01-20
Attending: EMERGENCY MEDICINE | Admitting: INTERNAL MEDICINE
Payer: MEDICARE

## 2020-01-19 DIAGNOSIS — T85.598A FEEDING TUBE DYSFUNCTION, INITIAL ENCOUNTER: Primary | ICD-10-CM

## 2020-01-19 PROBLEM — Z93.1 COMPLAINT ASSOCIATED WITH GASTRIC TUBE (HCC): Status: ACTIVE | Noted: 2020-01-19

## 2020-01-19 PROBLEM — R68.89 COMPLAINT ASSOCIATED WITH GASTRIC TUBE (HCC): Status: ACTIVE | Noted: 2020-01-19

## 2020-01-19 PROBLEM — K94.23 PEG TUBE MALFUNCTION (HCC): Status: ACTIVE | Noted: 2020-01-19

## 2020-01-19 LAB
ALBUMIN SERPL-MCNC: 3.6 G/DL (ref 3.4–5)
ALBUMIN/GLOB SERPL: 0.6 {RATIO} (ref 0.8–1.7)
ALP SERPL-CCNC: 107 U/L (ref 45–117)
ALT SERPL-CCNC: 23 U/L (ref 13–56)
ANION GAP SERPL CALC-SCNC: 4 MMOL/L (ref 3–18)
AST SERPL-CCNC: 20 U/L (ref 10–38)
BASOPHILS # BLD: 0 K/UL (ref 0–0.1)
BASOPHILS NFR BLD: 1 % (ref 0–2)
BILIRUB SERPL-MCNC: 0.6 MG/DL (ref 0.2–1)
BUN SERPL-MCNC: 14 MG/DL (ref 7–18)
BUN/CREAT SERPL: 18 (ref 12–20)
CALCIUM SERPL-MCNC: 9.6 MG/DL (ref 8.5–10.1)
CHLORIDE SERPL-SCNC: 104 MMOL/L (ref 100–111)
CO2 SERPL-SCNC: 29 MMOL/L (ref 21–32)
CREAT SERPL-MCNC: 0.8 MG/DL (ref 0.6–1.3)
DIFFERENTIAL METHOD BLD: ABNORMAL
EOSINOPHIL # BLD: 0.1 K/UL (ref 0–0.4)
EOSINOPHIL NFR BLD: 3 % (ref 0–5)
ERYTHROCYTE [DISTWIDTH] IN BLOOD BY AUTOMATED COUNT: 12.3 % (ref 11.6–14.5)
GLOBULIN SER CALC-MCNC: 6.3 G/DL (ref 2–4)
GLUCOSE SERPL-MCNC: 79 MG/DL (ref 74–99)
HCG SERPL QL: NEGATIVE
HCT VFR BLD AUTO: 43.2 % (ref 35–45)
HGB BLD-MCNC: 14.2 G/DL (ref 12–16)
LYMPHOCYTES # BLD: 2 K/UL (ref 0.9–3.6)
LYMPHOCYTES NFR BLD: 44 % (ref 21–52)
MCH RBC QN AUTO: 32.3 PG (ref 24–34)
MCHC RBC AUTO-ENTMCNC: 32.9 G/DL (ref 31–37)
MCV RBC AUTO: 98.2 FL (ref 74–97)
MONOCYTES # BLD: 0.4 K/UL (ref 0.05–1.2)
MONOCYTES NFR BLD: 9 % (ref 3–10)
NEUTS SEG # BLD: 2 K/UL (ref 1.8–8)
NEUTS SEG NFR BLD: 43 % (ref 40–73)
PLATELET # BLD AUTO: 282 K/UL (ref 135–420)
PMV BLD AUTO: 9.2 FL (ref 9.2–11.8)
POTASSIUM SERPL-SCNC: 4.1 MMOL/L (ref 3.5–5.5)
PROT SERPL-MCNC: 9.9 G/DL (ref 6.4–8.2)
RBC # BLD AUTO: 4.4 M/UL (ref 4.2–5.3)
SODIUM SERPL-SCNC: 137 MMOL/L (ref 136–145)
WBC # BLD AUTO: 4.5 K/UL (ref 4.6–13.2)

## 2020-01-19 PROCEDURE — 85025 COMPLETE CBC W/AUTO DIFF WBC: CPT

## 2020-01-19 PROCEDURE — 99284 EMERGENCY DEPT VISIT MOD MDM: CPT

## 2020-01-19 PROCEDURE — 99218 HC RM OBSERVATION: CPT

## 2020-01-19 PROCEDURE — 80053 COMPREHEN METABOLIC PANEL: CPT

## 2020-01-19 PROCEDURE — 96374 THER/PROPH/DIAG INJ IV PUSH: CPT

## 2020-01-19 PROCEDURE — 84703 CHORIONIC GONADOTROPIN ASSAY: CPT

## 2020-01-19 PROCEDURE — 51701 INSERT BLADDER CATHETER: CPT

## 2020-01-19 RX ORDER — GABAPENTIN 250 MG/5ML
100 SOLUTION ORAL 3 TIMES DAILY
COMMUNITY

## 2020-01-19 RX ORDER — CHLORTHALIDONE 50 MG/1
50 TABLET ORAL DAILY
COMMUNITY

## 2020-01-19 RX ORDER — DEXTROSE MONOHYDRATE AND SODIUM CHLORIDE 5; .45 G/100ML; G/100ML
125 INJECTION, SOLUTION INTRAVENOUS CONTINUOUS
Status: DISCONTINUED | OUTPATIENT
Start: 2020-01-19 | End: 2020-01-21 | Stop reason: HOSPADM

## 2020-01-19 NOTE — ED PROVIDER NOTES
EMERGENCY DEPARTMENT HISTORY AND PHYSICAL EXAM    Date: 1/19/2020  Patient Name: Radha Haider    History of Presenting Illness     Chief Complaint   Patient presents with    Feeding Tube Problem         History Provided By: Patient and Patient's Mother    Additional History (Context): Radha Haider is a 40 y.o. female with hypertension, obesity, stroke and GERD who presents with family having had her feeding tube come out spontaneously today somewhere between noon and 2 PM.  Family gave her a bath at noon everything was fine. Per family feeding tube was doing well; last feeding was at 930 this morning. They noticed at 2:00 feeding tube was sitting on top of the patient's abdomen. Patient denies any pain. Dr. Ramiro Randle is her GI physician. PCP: Misael Duran MD    Current Facility-Administered Medications   Medication Dose Route Frequency Provider Last Rate Last Dose    dextrose 5 % - 0.45% NaCl infusion  125 mL/hr IntraVENous CONTINUOUS Danelle Briggs PA         Current Outpatient Medications   Medication Sig Dispense Refill    benzonatate (TESSALON PERLES) 100 mg capsule Take 1 Cap by mouth two (2) times daily as needed for Cough for up to 20 doses. 20 Cap 0    ibuprofen (MOTRIN) 400 mg tablet Take 1 Tab by mouth every six (6) hours as needed for Pain. 20 Tab 0    gabapentin (NEURONTIN) 600 mg tablet Take 600 mg by mouth three (3) times daily.  levETIRAcetam (KEPPRA) 500 mg/5 mL (5 mL) solution Take 1,000 mg by mouth two (2) times daily (after meals).  therapeutic multivitamin (THERAGRAN) tablet Take 1 Tab by mouth daily.  melatonin 3 mg tablet Take 6 mg by mouth nightly.  albuterol (PROVENTIL HFA, VENTOLIN HFA, PROAIR HFA) 90 mcg/actuation inhaler Take 2 Puffs by inhalation every four (4) hours as needed for Wheezing. 1 Inhaler 0    amLODIPine (NORVASC) 10 mg tablet Take 10 mg by mouth daily.  carvedilol (COREG) 25 mg tablet Take 25 mg by mouth two (2) times a day. Past History     Past Medical History:  Past Medical History:   Diagnosis Date    Arthritis     osteoarthritis    Chronic bronchitis (HCC)     GERD (gastroesophageal reflux disease)     Hypertension     Morbid obesity ([facility] [address]     Stroke ([facility] [address] 2014    Left Side weakness       Past Surgical History:  Past Surgical History:   Procedure Laterality Date    HX TRACHEOSTOMY      Removed 2016    IR GASTROSTOMY TUBE PLACEMENT         Family History:  History reviewed. No pertinent family history. Social History:  Social History     Tobacco Use    Smoking status: Former Smoker     Last attempt to quit: 3/4/2014     Years since quittin.8    Smokeless tobacco: Never Used   Substance Use Topics    Alcohol use: No    Drug use: No       Allergies:  No Known Allergies      Review of Systems   Review of Systems   Constitutional: Negative for fever. Gastrointestinal: Negative for abdominal pain, nausea and vomiting. FT problem     All Other Systems Negative  Physical Exam     Vitals:    20 1513   BP: 121/84   Pulse: 61   Resp: 16   Temp: 99.7 °F (37.6 °C)   SpO2: 98%     Physical Exam  Vitals signs and nursing note reviewed. Constitutional:       Appearance: She is well-developed. She is obese. HENT:      Head: Normocephalic and atraumatic. Eyes:      Pupils: Pupils are equal, round, and reactive to light. Neck:      Thyroid: No thyromegaly. Vascular: No JVD. Trachea: No tracheal deviation. Cardiovascular:      Rate and Rhythm: Normal rate and regular rhythm. Heart sounds: Normal heart sounds. No murmur. No friction rub. No gallop. Pulmonary:      Effort: Pulmonary effort is normal. No respiratory distress. Breath sounds: Normal breath sounds. No stridor. No wheezing or rales. Chest:      Chest wall: No tenderness. Abdominal:      General: There is distension. Palpations: Abdomen is soft. There is no mass. Tenderness: There is no tenderness. There is no guarding or rebound. Comments: Stoma present in upper abdomen not bleeding not draining any active GI contents. There is no redness. Minimally discomforting on exam.   Musculoskeletal:         General: No tenderness. Lymphadenopathy:      Cervical: No cervical adenopathy. Skin:     General: Skin is warm and dry. Coloration: Skin is not pale. Findings: No erythema or rash. Neurological:      Mental Status: She is alert. Mental status is at baseline. Psychiatric:         Behavior: Behavior normal.         Thought Content: Thought content normal.          Diagnostic Study Results     Labs -   No results found for this or any previous visit (from the past 12 hour(s)). Radiologic Studies -   No orders to display     CT Results  (Last 48 hours)    None        CXR Results  (Last 48 hours)    None            Medical Decision Making   I am the first provider for this patient. I reviewed the vital signs, available nursing notes, past medical history, past surgical history, family history and social history. Vital Signs-Reviewed the patient's vital signs. Records Reviewed: Nursing Notes and Old Medical Records    Procedures:  Procedures    Provider Notes (Medical Decision Making): Unable to replace the G-tube. Spoke with Dr. Fiona Thomas for Dr. Bettina Oquendo. We will plan on having her admitted to the hospitalist and have the tube replaced tomorrow. I also called radiology but I are not available except emergently. Spoke with Dr. Berry for hospitalist service. We will get basic labs and keep her hydrated. Mother says that she is not surprised by this change of course. She said Bettina Oquendo was unable to place it in his office the last time and had to have it done with IR.     MED RECONCILIATION:  Current Facility-Administered Medications   Medication Dose Route Frequency    dextrose 5 % - 0.45% NaCl infusion  125 mL/hr IntraVENous CONTINUOUS     Current Outpatient Medications Medication Sig    benzonatate (TESSALON PERLES) 100 mg capsule Take 1 Cap by mouth two (2) times daily as needed for Cough for up to 20 doses.  ibuprofen (MOTRIN) 400 mg tablet Take 1 Tab by mouth every six (6) hours as needed for Pain.  gabapentin (NEURONTIN) 600 mg tablet Take 600 mg by mouth three (3) times daily.  levETIRAcetam (KEPPRA) 500 mg/5 mL (5 mL) solution Take 1,000 mg by mouth two (2) times daily (after meals).  therapeutic multivitamin (THERAGRAN) tablet Take 1 Tab by mouth daily.  melatonin 3 mg tablet Take 6 mg by mouth nightly.  albuterol (PROVENTIL HFA, VENTOLIN HFA, PROAIR HFA) 90 mcg/actuation inhaler Take 2 Puffs by inhalation every four (4) hours as needed for Wheezing.  amLODIPine (NORVASC) 10 mg tablet Take 10 mg by mouth daily.  carvedilol (COREG) 25 mg tablet Take 25 mg by mouth two (2) times a day. Disposition:  admit      Follow-up Information    None         Current Discharge Medication List        Diagnosis     Clinical Impression:   1.  Feeding tube dysfunction, initial encounter

## 2020-01-19 NOTE — H&P
History and Physical    Patient: Brian Stringer               Sex: female          DOA: 1/19/2020       YOB: 1975      Age:  40 y.o. Assessment/Plan     PEG tube dysfunction  -GI consulted for placement, plan to perform in IR suite. Hx of CVA/Aneurysm and L hemiparesis  -on neurontin, bed bound    HTN-hold coreg, amlodipine, norvasc    Obesity    Code status: Full  PPX:  none    HPI:     Chief Complaint   Patient presents with    Feeding Tube Problem       Brian Stringer is a 40 y.o. female with h/o HTN, ruptured brain aneurysm, and stroke with residual left sided weakness, prior tracheosteomy, and current PEG who presents to ED after her PEG tube fell out. She has had prior failed attempts with GI. Attempted and failed in ER by providers. Dr Fiona Thomas was consulted and GI will place PEG with IR the following day. Patient is non-verbal    Past Medical History:   Diagnosis Date    Arthritis     osteoarthritis    [facility] ([facility]     GERD (gastroesophageal reflux disease)     Hypertension     Morbid obesity ([facility]     Stroke [facility]) 2014    Left Side weakness       Prior to Admission Medications   Prescriptions Last Dose Informant Patient Reported? Taking? albuterol (PROVENTIL HFA, VENTOLIN HFA, PROAIR HFA) 90 mcg/actuation inhaler   No No   Sig: Take 2 Puffs by inhalation every four (4) hours as needed for Wheezing. amLODIPine (NORVASC) 10 mg tablet   Yes No   Sig: Take 10 mg by mouth daily. benzonatate (TESSALON PERLES) 100 mg capsule   No No   Sig: Take 1 Cap by mouth two (2) times daily as needed for Cough for up to 20 doses. carvedilol (COREG) 25 mg tablet   Yes No   Sig: Take 25 mg by mouth two (2) times a day.   gabapentin (NEURONTIN) 600 mg tablet   Yes No   Sig: Take 600 mg by mouth three (3) times daily.    ibuprofen (MOTRIN) 400 mg tablet   No No   Sig: Take 1 Tab by mouth every six (6) hours as needed for Pain.   levETIRAcetam (KEPPRA) 500 mg/5 mL (5 mL) solution   Yes No   Sig: Take 1,000 mg by mouth two (2) times daily (after meals). melatonin 3 mg tablet   Yes No   Sig: Take 6 mg by mouth nightly. therapeutic multivitamin [facility]) tablet   Yes No   Sig: Take 1 Tab by mouth daily. Facility-Administered Medications: None       Social History:  Social History     Socioeconomic History    Marital status: LEGALLY      Spouse name: Not on file    Number of children: Not on file    Years of education: Not on file    Highest education level: Not on file   Occupational History    Not on file   Social Needs    Financial resource strain: Not on file    Food insecurity:     Worry: Not on file     Inability: Not on file    Transportation needs:     Medical: Not on file     Non-medical: Not on file   Tobacco Use    Smoking status: Former Smoker     Last attempt to quit: 3/4/2014     Years since quittin.8    Smokeless tobacco: Never Used   Substance and Sexual Activity    Alcohol use: No    Drug use: No    Sexual activity: Not on file   Lifestyle    Physical activity:     Days per week: Not on file     Minutes per session: Not on file    Stress: Not on file   Relationships    Social connections:     Talks on phone: Not on file     Gets together: Not on file     Attends Jainism service: Not on file     Active member of club or organization: Not on file     Attends meetings of clubs or organizations: Not on file     Relationship status: Not on file    Intimate partner violence:     Fear of current or ex partner: Not on file     Emotionally abused: Not on file     Physically abused: Not on file     Forced sexual activity: Not on file   Other Topics Concern    Not on file   Social History Narrative    Not on file       Family History:  History reviewed. No pertinent family history.     Surgical History:  Past Surgical History:   Procedure Laterality Date    HX TRACHEOSTOMY      Removed 2016    IR GASTROSTOMY TUBE PLACEMENT   No Known Allergies    Review of Systems  Patient is non verbal    Physical Exam:      Visit Vitals  /84 (BP 1 Location: Left arm, BP Patient Position: At rest;Supine)   Pulse 61   Temp 99.7 °F (37.6 °C)   Resp 16   SpO2 98%       Physical Exam:  Gen:  No distress, alert, responds  HEENT:  Normal cephalic atraumatic, extra-occular movements are intact.   Neck:  Supple, No JVD  Lungs:  Clear bilaterally, no wheeze, no rales, normal effort  Heart:  Regular Rate and Rhythm, normal S1 and S2, no edema  Abdomen:  Large Soft, oozing around PEG site in middle of abd  Extremities:  Well perfused, no cyanosis or edema  Neurological:  Awake and alert,   Skin:  No rashes or moles  Psych:  deferred    Laboratory Studies:  -pending    Rad:  N/A

## 2020-01-20 ENCOUNTER — ANESTHESIA (OUTPATIENT)
Dept: ENDOSCOPY | Age: 45
End: 2020-01-20
Payer: MEDICARE

## 2020-01-20 ENCOUNTER — ANESTHESIA EVENT (OUTPATIENT)
Dept: ENDOSCOPY | Age: 45
End: 2020-01-20
Payer: MEDICARE

## 2020-01-20 VITALS
SYSTOLIC BLOOD PRESSURE: 144 MMHG | TEMPERATURE: 98.6 F | RESPIRATION RATE: 18 BRPM | HEART RATE: 91 BPM | DIASTOLIC BLOOD PRESSURE: 98 MMHG | OXYGEN SATURATION: 94 %

## 2020-01-20 LAB
APPEARANCE UR: ABNORMAL
BACTERIA URNS QL MICRO: NEGATIVE /HPF
BILIRUB UR QL: NEGATIVE
COLOR UR: YELLOW
EPITH CASTS URNS QL MICRO: NORMAL /LPF (ref 0–5)
GLUCOSE UR STRIP.AUTO-MCNC: NEGATIVE MG/DL
HGB UR QL STRIP: NEGATIVE
KETONES UR QL STRIP.AUTO: NEGATIVE MG/DL
LEUKOCYTE ESTERASE UR QL STRIP.AUTO: ABNORMAL
MUCOUS THREADS URNS QL MICRO: NEGATIVE /LPF
NITRITE UR QL STRIP.AUTO: NEGATIVE
PH UR STRIP: >8.5 [PH] (ref 5–8)
PROT UR STRIP-MCNC: NEGATIVE MG/DL
RBC #/AREA URNS HPF: NORMAL /HPF (ref 0–5)
SP GR UR REFRACTOMETRY: 1.02 (ref 1–1.03)
UROBILINOGEN UR QL STRIP.AUTO: 1 EU/DL (ref 0.2–1)
WBC URNS QL MICRO: NORMAL /HPF (ref 0–4)

## 2020-01-20 PROCEDURE — 99218 HC RM OBSERVATION: CPT

## 2020-01-20 PROCEDURE — 74011250636 HC RX REV CODE- 250/636: Performed by: NURSE ANESTHETIST, CERTIFIED REGISTERED

## 2020-01-20 PROCEDURE — 76040000007: Performed by: INTERNAL MEDICINE

## 2020-01-20 PROCEDURE — 77030037186 HC VLV ENDOSC STRL DEFENDO DISP MVAT -A: Performed by: INTERNAL MEDICINE

## 2020-01-20 PROCEDURE — 74011250636 HC RX REV CODE- 250/636: Performed by: INTERNAL MEDICINE

## 2020-01-20 PROCEDURE — 76060000032 HC ANESTHESIA 0.5 TO 1 HR: Performed by: INTERNAL MEDICINE

## 2020-01-20 PROCEDURE — 74011250636 HC RX REV CODE- 250/636: Performed by: EMERGENCY MEDICINE

## 2020-01-20 PROCEDURE — 74011000250 HC RX REV CODE- 250: Performed by: NURSE ANESTHETIST, CERTIFIED REGISTERED

## 2020-01-20 PROCEDURE — 81001 URINALYSIS AUTO W/SCOPE: CPT

## 2020-01-20 PROCEDURE — 74011000258 HC RX REV CODE- 258: Performed by: INTERNAL MEDICINE

## 2020-01-20 PROCEDURE — 96374 THER/PROPH/DIAG INJ IV PUSH: CPT

## 2020-01-20 PROCEDURE — 77030040361 HC SLV COMPR DVT MDII -B

## 2020-01-20 PROCEDURE — 77030005122 HC CATH GASTMY PEG BSC -B: Performed by: INTERNAL MEDICINE

## 2020-01-20 DEVICE — IMPLANTABLE DEVICE: Type: IMPLANTABLE DEVICE | Site: ABDOMEN | Status: FUNCTIONAL

## 2020-01-20 RX ORDER — CEFAZOLIN SODIUM 1 G/3ML
INJECTION, POWDER, FOR SOLUTION INTRAMUSCULAR; INTRAVENOUS
Status: DISCONTINUED
Start: 2020-01-20 | End: 2020-01-21 | Stop reason: HOSPADM

## 2020-01-20 RX ORDER — MORPHINE SULFATE 4 MG/ML
4 INJECTION, SOLUTION INTRAMUSCULAR; INTRAVENOUS
Status: COMPLETED | OUTPATIENT
Start: 2020-01-20 | End: 2020-01-20

## 2020-01-20 RX ORDER — SODIUM CHLORIDE 0.9 % (FLUSH) 0.9 %
5-40 SYRINGE (ML) INJECTION EVERY 8 HOURS
Status: CANCELLED | OUTPATIENT
Start: 2020-01-20

## 2020-01-20 RX ORDER — SODIUM CHLORIDE, SODIUM LACTATE, POTASSIUM CHLORIDE, CALCIUM CHLORIDE 600; 310; 30; 20 MG/100ML; MG/100ML; MG/100ML; MG/100ML
INJECTION, SOLUTION INTRAVENOUS
Status: DISCONTINUED | OUTPATIENT
Start: 2020-01-20 | End: 2020-01-20 | Stop reason: HOSPADM

## 2020-01-20 RX ORDER — ONDANSETRON 4 MG/1
4 TABLET, ORALLY DISINTEGRATING ORAL
Status: DISCONTINUED | OUTPATIENT
Start: 2020-01-20 | End: 2020-01-21 | Stop reason: HOSPADM

## 2020-01-20 RX ORDER — DEXTROMETHORPHAN/PSEUDOEPHED 2.5-7.5/.8
1.2 DROPS ORAL
Status: CANCELLED | OUTPATIENT
Start: 2020-01-20

## 2020-01-20 RX ORDER — LIDOCAINE HYDROCHLORIDE 20 MG/ML
INJECTION, SOLUTION EPIDURAL; INFILTRATION; INTRACAUDAL; PERINEURAL AS NEEDED
Status: DISCONTINUED | OUTPATIENT
Start: 2020-01-20 | End: 2020-01-20 | Stop reason: HOSPADM

## 2020-01-20 RX ORDER — PROPOFOL 10 MG/ML
INJECTION, EMULSION INTRAVENOUS AS NEEDED
Status: DISCONTINUED | OUTPATIENT
Start: 2020-01-20 | End: 2020-01-20 | Stop reason: HOSPADM

## 2020-01-20 RX ORDER — SODIUM CHLORIDE 0.9 % (FLUSH) 0.9 %
5-40 SYRINGE (ML) INJECTION AS NEEDED
Status: CANCELLED | OUTPATIENT
Start: 2020-01-20

## 2020-01-20 RX ORDER — SODIUM CHLORIDE 9 MG/ML
75 INJECTION, SOLUTION INTRAVENOUS CONTINUOUS
Status: DISCONTINUED | OUTPATIENT
Start: 2020-01-20 | End: 2020-01-21 | Stop reason: HOSPADM

## 2020-01-20 RX ADMIN — PROPOFOL 10 MG: 10 INJECTION, EMULSION INTRAVENOUS at 14:27

## 2020-01-20 RX ADMIN — SODIUM CHLORIDE 75 ML/HR: 900 INJECTION, SOLUTION INTRAVENOUS at 12:14

## 2020-01-20 RX ADMIN — PROPOFOL 10 MG: 10 INJECTION, EMULSION INTRAVENOUS at 14:40

## 2020-01-20 RX ADMIN — LIDOCAINE HYDROCHLORIDE 60 MG: 20 INJECTION, SOLUTION INTRAVENOUS at 14:21

## 2020-01-20 RX ADMIN — PROPOFOL 10 MG: 10 INJECTION, EMULSION INTRAVENOUS at 14:38

## 2020-01-20 RX ADMIN — PROPOFOL 10 MG: 10 INJECTION, EMULSION INTRAVENOUS at 14:29

## 2020-01-20 RX ADMIN — PROPOFOL 100 MG: 10 INJECTION, EMULSION INTRAVENOUS at 14:21

## 2020-01-20 RX ADMIN — PROPOFOL 10 MG: 10 INJECTION, EMULSION INTRAVENOUS at 14:25

## 2020-01-20 RX ADMIN — PROPOFOL 50 MG: 10 INJECTION, EMULSION INTRAVENOUS at 14:33

## 2020-01-20 RX ADMIN — MORPHINE SULFATE 4 MG: 4 INJECTION, SOLUTION INTRAMUSCULAR; INTRAVENOUS at 03:46

## 2020-01-20 RX ADMIN — SODIUM CHLORIDE, SODIUM LACTATE, POTASSIUM CHLORIDE, AND CALCIUM CHLORIDE: 600; 310; 30; 20 INJECTION, SOLUTION INTRAVENOUS at 14:14

## 2020-01-20 RX ADMIN — PROPOFOL 10 MG: 10 INJECTION, EMULSION INTRAVENOUS at 14:34

## 2020-01-20 RX ADMIN — PROPOFOL 20 MG: 10 INJECTION, EMULSION INTRAVENOUS at 14:24

## 2020-01-20 RX ADMIN — PROPOFOL 10 MG: 10 INJECTION, EMULSION INTRAVENOUS at 14:31

## 2020-01-20 RX ADMIN — PROPOFOL 10 MG: 10 INJECTION, EMULSION INTRAVENOUS at 14:36

## 2020-01-20 NOTE — PROCEDURES
Endoscopy Procedure Note    Patient: Pernell Montelongo MRN: [id number]  SSN: [id number]    YOB: 1975  Age: 40 y.o. Sex: female      Date/Time:  1/20/2020 2:55 PM       Esophagogastroduodenoscopy (EGD) Procedure Note    Procedure: Esophagogastroduodenoscopy with placement of a percutaneous endoscopic gastrostomy tube    IMPRESSION:   1. -2 cm hiatal hernia. 2. -Otherwise normal upper endoscopy. 3. -Peg placed thru existing gastro-cutaneous fistula    RECOMMENDATIONS:  1. -The PEG tube may be used for feedings as soon as bowel sounds are confirmed post-procedure. The head of the bed should be kept elevated to 30 degrees during tube feeds, and the tube should be flushed with 30 mL of water every 8 hours and after giving m, edications through the tube. Monitor the PEG site for bleeding and infection. Indication: Dysphagia/odynophagia  :  Linda Duarte MD  Referring Provider:   Karlee Flores MD    Assistants: Endoscopy Technician-1: [facility]  Endoscopy RN-1: Almeda Holter, RN, None    History: The history and physical exam were reviewed and updated. Endoscope: [id number]  Extent of Exam: second portion of the duodenum  ASA: ASA 3 - Patient with moderate systemic disease with functional limitations  Anethesia/Sedation:  MAC anesthesia    Description of the procedure: The procedure was discussed with the patient including risks, benefits, alternatives including risks of iv sedation, bleeding, perforation and aspiration. A safety timeout was performed. The patient was placed in the left lateral decubitus position. A bite block was placed. The patient was given incremental doses of intravenous sedation until moderate sedation was achieved. The patients vital signs were monitored at all times including heart rate/rhythm, blood pressure and oxygen saturation. The endoscope was then passed under direct visualization to the second portion of the duodenum.   The endoscope was then slowly withdrawn while visualizing the mucosa. In the stomach a retroflexion was performed and gastric fundus and cardia visualized. The endoscope was then slowly withdrawn. The patient was then transferred to recovery in stable condition. Findings:    Esophagus: The esophageal mucosa was normal with no ulceration, mass or stricture. There was no evidence of Peter's esophagus or reflux esophagitis. Stomach: The gastric mucosa was normal with no ulceration, mass, stricture. Duodenum: The duodenum mucosa was normal with no ulceration, mass, stricture and no evidence of villous atrophy. Therapies:  Peg placement    Specimens: * No specimens in log *            Complications:   None; patient tolerated the procedure well.     EBL:Minimal  Prosthetic devices, grafts, tissues or devices implanted: None    Discharge disposition:  Back to room    Brenda Farrell MD, IVONNE Lin  January 20, 2020  2:55 PM

## 2020-01-20 NOTE — ED NOTES
TRANSFER - ED to INPATIENT REPORT:    Report consisted of patients Situation, Background, Assessment and   Recommendations(SBAR). SBAR report made available to receiving floor on this patient being transferred to [address] [place] [address] (2200)  for routine progression of care       Admitting diagnosis PEG tube malfunction ([facility] [address] [K94.23]    Information from the following report(s) SBAR, Procedure Summary and MAR was made available to receiving floor. Lines:   Peripheral IV 01/19/20 Right Hand (Active)   Site Assessment Clean, dry, & intact 1/19/2020  7:25 PM   Phlebitis Assessment 0 1/19/2020  7:25 PM   Infiltration Assessment 0 1/19/2020  7:25 PM   Dressing Status Clean, dry, & intact 1/19/2020  7:25 PM   Hub Color/Line Status Yellow 1/19/2020  7:25 PM        Opportunity for questions and clarification was provided.       Patient is only aware of  place, person and situation   Patient is  incontinent and non-ambulatory     Valuables transported with patient     Patient transported with:   Tech      =Monitored (most recent)  Vitals w/ MEWS Score (last day)     Date/Time MEWS Score Pulse Resp Temp BP Level of Consciousness SpO2    01/19/20 1513  1  61  16  99.7 °F (37.6 °C)  121/84  Alert  98 %

## 2020-01-20 NOTE — ANESTHESIA PREPROCEDURE EVALUATION
Relevant Problems   No relevant active problems       Anesthetic History   No history of anesthetic complications            Review of Systems / Medical History  Patient summary reviewed, nursing notes reviewed and pertinent labs reviewed    Pulmonary                Comments: Hx/o Respiratory failure, S/P Tracheostomy following remote hx/o ruptured intracranial aneurysm   Neuro/Psych       CVA  TIA     Cardiovascular    Hypertension              Exercise tolerance: <4 METS     GI/Hepatic/Renal     GERD           Endo/Other        Obesity, morbid obesity and arthritis     Other Findings                 Anesthetic Plan    ASA: 3  Anesthesia type: MAC, total IV anesthesia and general - backup          Induction: Intravenous  Anesthetic plan and risks discussed with:  Mother, Family and Patient

## 2020-01-20 NOTE — ED NOTES
Patient straight cathed with assisting RN. Cleaned of incontinence and placed into clean brief and gown.

## 2020-01-20 NOTE — PROGRESS NOTES
Transportation at Discharge:  1/20/2020    Transport Company/Representative:  Sonya Lopez / [employer]  Transportation Phone number: [phone]  Method of Transport: Winston Daniel / JUAN    Estimated pick-up time: 5:30P  Destination:  Home Address  [address] [place] [address]    Insurance Info:  [facility] Medicare / Jennifer Esc[facility]   Authorization: [id number] Per Kody Blood at [address]:  [facility], [address] [place] [address] Specialist ext [phone]

## 2020-01-20 NOTE — PROGRESS NOTES
Problem: Discharge Planning  Goal: *Discharge to safe environment  Outcome: Resolved/Met  Plan is home    Reason for Admission:   Peg tube replacement                  RRAT Score:     14             Do you (patient/family) have any concerns for transition/discharge? Need medical stretcher transport              Plan for utilizing home health:   no  Current Advanced Directive/Advance Care Plan:              Transition of Care Plan:      Chart reviewed and pt's Guardian (Mom ) verified demographics. She lives with her mom but is unable to care for self. She came due to her peg tube came ou and needed to be replaced. History of anneurysm , stroke, peg tube, use to have a trach, but removed in 2016. Patient is just out of PACU now, family is anxious to go home. I verifed with mom that pt needs stretcher transport home and she said yes. Plan is home. Patient has designated _Mom ( Guardian) ______________________ to participate in his/her discharge plan and to receive any needed information. Name: Zeeshan Medina [phone]/ [phone]      Care Management Interventions  Palliative Care Criteria Met (RRAT>21 & CHF Dx)?: No  Mode of Transport at Discharge: S  Transition of Care Consult (CM Consult): Discharge Planning  MyChart Signup: No  Discharge Durable Medical Equipment: No  Physical Therapy Consult: No  Occupational Therapy Consult: No  Speech Therapy Consult: No  Current Support Network: Other(Lives with mother)  Confirm Follow Up Transport: Other (see comment)  Discharge Location  Discharge Placement: Home     Patient to go home, mom states she will need her stretcher transport. Transportation arranged for 5:30, RN aware, family aware, MD aware. Patient and/or next of kin has been given and has signed the [facility] Outpatient Observation  Notification letter and all questions answered. Copy of this notice given to patient and copy placed on chart.     Patient and/or next of kin has been given the Outpatient Observation Information and Notification letter and all questions answered. Robert Nieto.  AQUILES Wild  [facility]  [phone], Pager [phone]  [email or web address]

## 2020-01-20 NOTE — PROGRESS NOTES
Pt received to unit from ED, via stretcher, accompanied by Jasmin Ana (Mother) and Felix Irene (Andrei [facility] room 21 . She is A&Ox4; gestured to abd when asked if she knew the reason she has been admitted to the hospital. Has delayed response and soft spoken. Denies having pain. Previous peg tube site DENISE with moist appearance. No s/s of distress noted. No complaints voiced at this time. Pt oriented to room and use of call bell for assistance; Pt voiced understanding. Bed locked in low position; call bell in reach. See Admission assessment for further findings. 1516- This nurse was granted permission by Patient and Jasmin Perez (Mother) to call concerning the date that Patient had received her flu vaccine. Called [facility] spoke with Leonarda Carl, (Medical Assistant) said no information about flu vaccine. 1000- This nurse called Dr. Radha Arriaza office concerning the date that Patient had flu vaccine was received spoke with [facility]) said would have nurse return call. [phone]- Patient to have pegtube reinserted called Endo spoke with [facility] ([facility]) said would have nurse call floor. 1217-This nurse called Endo spoke with [facility] ([facility]) said that Patient is suppose to go to IR. Called [facility] spoke with [facility] said would have someone return call to floor. Patient awake; nurse; mother and niece at bedside, made aware. [phone] [facility] return call said that no order written for Peg tube reinsertion. Dr. Jesica Domínguez was called; awaiting call back. 36- Dr. Jesica Domínguez return call made aware that this nurse had called Endo and IR concerning Peg tube insertion; that [facility] said that there was no order written. Dr. Jesica Domínguez was asked if he wanted this nurse to call Dr. Neel Romero () d/t name in progress note said yes. Criselda Jasmine 303- Dr. Jeff Soto office was called spoke with Lesly Fu LPN made aware that Dr. Vanda Rizzo () to floor; voiced understanding. 1099- Patient and Jasmin Perez (Mother) request flu vaccine for Patient. [phone] This nurse and Natalie Pallas, RN ([place]) at bedside. Patient awake. Has positive bowel sounds. This nurse remained at bedside and discharge instruction given; voiced understanding. Ant Sanford (Mother) has Patient's belongings.

## 2020-01-20 NOTE — DISCHARGE INSTRUCTIONS
DISCHARGE SUMMARY from Nurse    PATIENT INSTRUCTIONS:    After general anesthesia or intravenous sedation, for 24 hours or while taking prescription Narcotics:  · Limit your activities  · Do not drive and operate hazardous machinery  · Do not make important personal or business decisions  · Do  not drink alcoholic beverages  · If you have not urinated within 8 hours after discharge, please contact your surgeon on call. Report the following to your surgeon:  · Excessive pain, swelling, redness or odor of or around the surgical area  · Temperature over 100.5  · Nausea and vomiting lasting longer than 4 hours or if unable to take medications  · Any signs of decreased circulation or nerve impairment to extremity: change in color, persistent  numbness, tingling, coldness or increase pain  · Any questions    What to do at Home:    RECOMMENDATIONS:  * The PEG tube may be used for feedings as soon as bowel sounds are confirmed post-procedure. The head of the bed should be kept elevated to 30 degrees during tube feeds, and the tube should be flushed with 30 mL of water every 8 hours and after giving medications through the tube. Monitor the PEG site for bleeding and infection. * Recommended activity: as directed by the doctor. * If you experience any of the following symptoms as listed in your teaching for PEG (Percutaneous Endoscopic Gastrostomy): Post -op under \"When should you call for help? \", please follow up with your Primary Care Physician and/ or call 911. *  Please give a list of your current medications to your Primary Care Provider. *  Please update this list whenever your medications are discontinued, doses are      changed, or new medications (including over-the-counter products) are added. *  Please carry medication information at all times in case of emergency situations.     These are general instructions for a healthy lifestyle:    No smoking/ No tobacco products/ Avoid exposure to second hand smoke  Surgeon General's Warning:  Quitting smoking now greatly reduces serious risk to your health. Obesity, smoking, and sedentary lifestyle greatly increases your risk for illness    A healthy diet, regular physical exercise & weight monitoring are important for maintaining a healthy lifestyle    You may be retaining fluid if you have a history of heart failure or if you experience any of the following symptoms:  Weight gain of 3 pounds or more overnight or 5 pounds in a week, increased swelling in our hands or feet or shortness of breath while lying flat in bed. Please call your doctor as soon as you notice any of these symptoms; do not wait until your next office visit. The discharge information has been reviewed with the {PATIENT PARENT GUARDIAN:44581}. The {PATIENT PARENT GUARDIAN:19810} verbalized understanding. Discharge medications reviewed with the {Dishcarge meds reviewed IUFQ:33830} and appropriate educational materials and side effects teaching were provided.   ___________________________________________________________________________________________________________________________________

## 2020-01-20 NOTE — CONSULTS
Gastroenterology Consult    Patient: Kim Lei MRN: [id number]  SSN: [id number]    YOB: 1975  Age: 40 y.o. Sex: female      Subjective:      Kim Lei is a 40y.o. year old [facility] female who is being seen by Sussy Zarco MD for Peg placement. The pt has  a h/o HTN, ruptured brain aneurysm, and stroke with residual left sided weakness, prior tracheosteomy, and current PEG who presents to ED after her PEG tube fell out. Replacement . Attempted and failed in ER by providers    Hospital Problems  Date Reviewed: 3/5/2019          Codes Class Noted POA    * (Principal) Complaint associated with gastric tube [facility]) ICD-10-CM: R68.89, Z93.1  ICD-9-CM: 780.99, V44.1  2020 Unknown        PEG tube malfunction ([facility] [address] ICD-10-CM: Z43.66  ICD-9-CM: 536.42  2020 Unknown            Past Medical History:   Diagnosis Date    Arthritis     osteoarthritis    Chronic bronchitis ([facility]     GERD (gastroesophageal reflux disease)     Hypertension     Morbid obesity ([facility] [address]     Stroke ([facility] [address]     Left Side weakness     Past Surgical History:   Procedure Laterality Date    HX TRACHEOSTOMY      Removed     IR GASTROSTOMY TUBE PLACEMENT        History reviewed. No pertinent family history.   Social History     Tobacco Use    Smoking status: Former Smoker     Last attempt to quit: 3/4/2014     Years since quittin.8    Smokeless tobacco: Never Used   Substance Use Topics    Alcohol use: No      Current Facility-Administered Medications   Medication Dose Route Frequency Provider Last Rate Last Dose    0.9% sodium chloride infusion  75 mL/hr IntraVENous CONTINUOUS Vikram Rook, DO 75 mL/hr at 20 1214 75 mL/hr at 20 1214    ondansetron (ZOFRAN ODT) tablet 4 mg  4 mg Oral Q6H PRN Vikram Rook, DO        dextrose 5 % - 0.45% NaCl infusion  125 mL/hr IntraVENous CONTINUOUS Vikram Rook, DO            No Known Allergies    Review of Systems:  Constitutional: negative  Ears, Nose, Mouth, Throat, and Face: negative  Respiratory: negative  Cardiovascular: negative  Neurological: CVA  Behavioral/Psychiatric: negative    Objective:     Blood pressure 110/76, pulse 62, temperature 97.5 °F (36.4 °C), resp. rate 16, SpO2 98 %. Physical Exam:  GENERAL: alert, cooperative, no distress, appears stated age  THROAT & NECK: normal  LUNG: clear to auscultation bilaterally  HEART: regular rate and rhythm  ABDOMEN: soft, non-tender. Bowel sounds normal. No masses,  no organomegaly. Peg site in upper abdomen  EXTREMITIES:  extremities normal, atraumatic, no cyanosis or edema  SKIN: Normal.  RECTAL: deferred    Recent Results (from the past 24 hour(s))   CBC WITH AUTOMATED DIFF    Collection Time: 01/19/20  7:28 PM   Result Value Ref Range    WBC 4.5 (L) 4.6 - 13.2 K/uL    RBC 4.40 4.20 - 5.30 M/uL    HGB 14.2 12.0 - 16.0 g/dL    HCT 43.2 35.0 - 45.0 %    MCV 98.2 (H) 74.0 - 97.0 FL    MCH 32.3 24.0 - 34.0 PG    MCHC 32.9 31.0 - 37.0 g/dL    RDW 12.3 11.6 - 14.5 %    PLATELET 818 858 - 828 K/uL    MPV 9.2 9.2 - 11.8 FL    NEUTROPHILS 43 40 - 73 %    LYMPHOCYTES 44 21 - 52 %    MONOCYTES 9 3 - 10 %    EOSINOPHILS 3 0 - 5 %    BASOPHILS 1 0 - 2 %    ABS. NEUTROPHILS 2.0 1.8 - 8.0 K/UL    ABS. LYMPHOCYTES 2.0 0.9 - 3.6 K/UL    ABS. MONOCYTES 0.4 0.05 - 1.2 K/UL    ABS. EOSINOPHILS 0.1 0.0 - 0.4 K/UL    ABS.  BASOPHILS 0.0 0.0 - 0.1 K/UL    DF AUTOMATED     HCG QL SERUM    Collection Time: 01/19/20  7:28 PM   Result Value Ref Range    HCG, Ql. NEGATIVE  NEG     METABOLIC PANEL, COMPREHENSIVE    Collection Time: 01/19/20  7:28 PM   Result Value Ref Range    Sodium 137 136 - 145 mmol/L    Potassium 4.1 3.5 - 5.5 mmol/L    Chloride 104 100 - 111 mmol/L    CO2 29 21 - 32 mmol/L    Anion gap 4 3.0 - 18 mmol/L    Glucose 79 74 - 99 mg/dL    BUN 14 7.0 - 18 MG/DL    Creatinine 0.80 0.6 - 1.3 MG/DL    BUN/Creatinine ratio 18 12 - 20      GFR est AA >60 >60 ml/min/1.73m2 GFR est non-AA >60 >60 ml/min/1.73m2    Calcium 9.6 8.5 - 10.1 MG/DL    Bilirubin, total 0.6 0.2 - 1.0 MG/DL    ALT (SGPT) 23 13 - 56 U/L    AST (SGOT) 20 10 - 38 U/L    Alk. phosphatase 107 45 - 117 U/L    Protein, total 9.9 (H) 6.4 - 8.2 g/dL    Albumin 3.6 3.4 - 5.0 g/dL    Globulin 6.3 (H) 2.0 - 4.0 g/dL    A-G Ratio 0.6 (L) 0.8 - 1.7     URINALYSIS W/ RFLX MICROSCOPIC    Collection Time: 01/20/20  3:16 AM   Result Value Ref Range    Color YELLOW      Appearance CLOUDY      Specific gravity 1.016 1.005 - 1.030      pH (UA) >8.5 (H) 5.0 - 8.0    Protein NEGATIVE  NEG mg/dL    Glucose NEGATIVE  NEG mg/dL    Ketone NEGATIVE  NEG mg/dL    Bilirubin NEGATIVE  NEG      Blood NEGATIVE  NEG      Urobilinogen 1.0 0.2 - 1.0 EU/dL    Nitrites NEGATIVE  NEG      Leukocyte Esterase SMALL (A) NEG     URINE MICROSCOPIC ONLY    Collection Time: 01/20/20  3:16 AM   Result Value Ref Range    WBC 4 to 10 0 - 4 /hpf    RBC 0 to 3 0 - 5 /hpf    Epithelial cells 2+ 0 - 5 /lpf    Bacteria NEGATIVE  NEG /hpf    Mucus NEGATIVE  NEG /lpf       No results found. Assessment:   1. Hypertension  2. S.p ruptured brain aneurysm with CVA and left sided weakness  3. Peg fell out      Plan:   1.  EGD to replace Peg      Signed By: Linda Duarte MD, Adrianna Sandoval, AGAF    January 20, 2020

## 2020-01-20 NOTE — PROGRESS NOTES
TRANSFER - IN REPORT:    Verbal report received from Catalino Ji RN(name) on [address]  being received from Emergency Dept(unit) for routine progression of care      Report consisted of patients Situation, Background, Assessment and   Recommendations(SBAR). Information from the following report(s) SBAR, Procedure Summary and MAR was reviewed with the receiving nurse. Opportunity for questions and clarification was provided. Assessment completed upon patients arrival to unit and care assumed. Keke Lazo RN (preceptor)    [phone] assessment complete with Charge Nurse, [facility] patient has active/positive bowel sounds. 1820-Patient discharge; no distress noted, accompanied by medical transport via stretcher. Keke Lazo RN  reviewed discharged instruction with patient.

## 2020-01-20 NOTE — ANESTHESIA POSTPROCEDURE EVALUATION
Procedure(s):  ESOPHAGOGASTRODUODENOSCOPY (EGD)  PERCUTANEOUS ENDOSCOPIC GASTROSTOMY TUBE INSERTION. MAC, total IV anesthesia, general - backup    Anesthesia Post Evaluation      Multimodal analgesia: multimodal analgesia used between 6 hours prior to anesthesia start to PACU discharge  Patient location during evaluation: bedside  Patient participation: complete - patient participated  Level of consciousness: awake  Pain management: adequate  Airway patency: patent  Anesthetic complications: no  Cardiovascular status: stable  Respiratory status: acceptable  Hydration status: acceptable  Post anesthesia nausea and vomiting:  controlled      No vitals data found for the desired time range.

## 2020-01-20 NOTE — PROGRESS NOTES
attempted to complete the initial Spiritual Assessment of the patient in bed 5 of the emergency room  and offer Pastoral Care support to her but found her resting peacefully at this time There were family members in the room which I was able to talk with. . Patient does not have any Jain/cultural needs that will affect patients preferences in health care. Chaplains will continue to follow and will provide pastoral care on an as needed/requested basis.     [facility] Care Department  [phone]

## 2020-01-22 NOTE — DISCHARGE SUMMARY
[address]    Name:  Isaias Gomes  MR#:   [id number]  :  1975  ACCOUNT #:  [de-identified]  ADMIT DATE:  2020  DISCHARGE DATE:  2020    ADMITTING DIAGNOSIS:  PEG tube malfunction. HISTORY OF PRESENT ILLNESS:  The patient is a 51-year-old female who presented to the emergency department after her PEG tube became sludged at home and was pulled all the way out. She has a history of stroke, which is what has necessitated her PEG tube. She presented to the emergency room where she was admitted for ongoing management. HOSPITAL COURSE:  The patient underwent successful PEG tube reinsertion with gastroenterology. She has tolerated this well. She has no further need for hospitalization and she will leave the hospital later today. DISCHARGE DIAGNOSES:  1. PEG tube malfunction, resolved. 2.  Cerebrovascular disease, status post stroke. 3.  Hypertension. 4.  Morbid obesity. CONDITION ON DISCHARGE:  Improved. ACTIVITY:  Ad ryan. DISCHARGE INSTRUCTIONS:  Followup is with her primary care provider in 1 week, patient will arrange. DISCHARGE MEDICATIONS:  1. Albuterol 2 puffs via inhaler every 4 hours as needed for wheezing. 2.  Norvasc 10 mg by mouth daily. 3.  Coreg 25 mg by mouth 2 times daily. 4.  Chlorthalidone 50 mg by mouth daily. 5.  Gabapentin 100 mg by mouth 3 times daily. 6.  Ibuprofen 400 mg by mouth every 6 hours as needed. 7.  Keppra 750 mg by mouth 2 times daily. 8.  Melatonin 6 mg by mouth nightly. 9.  Multivitamin 1 tablet by mouth daily. DIET:  Via PEG tube feeding. DISPOSITION:  Home.       Total Discharge time 35 minutes      William Medina MD      [id number]  D:  2020 10:05  T:  2020 22:29  JOB #:  [id number]  CC:  Fermin Arndt MD

## 2021-11-30 NOTE — ROUTINE PROCESS
TRANSFER - OUT REPORT: 
 
Verbal report given to Eileen Wheatley RN (name) on Dagoberto Macdonald  being transferred to 2200(unit) for routine post - op Report consisted of patients Situation, Background, Assessment and  
Recommendations(SBAR). Information from the following report(s) Procedure Summary was reviewed with the receiving nurse. Lines:  
Peripheral IV 01/19/20 Right Hand (Active) Site Assessment Clean, dry, & intact 1/19/2020  7:25 PM  
Phlebitis Assessment 0 1/19/2020  7:25 PM  
Infiltration Assessment 0 1/19/2020  7:25 PM  
Dressing Status Clean, dry, & intact 1/19/2020  7:25 PM  
Hub Color/Line Status Yellow 1/19/2020  7:25 PM  
  
 
Opportunity for questions and clarification was provided. Patient transported with: 
 [employer]r Attending Attestation (For Attendings USE Only)...

## (undated) DEVICE — KIT COLON W/ 1.1OZ LUB AND 2 END

## (undated) DEVICE — BASIN EMESIS 500CC ROSE 250/CS 60/PLT: Brand: MEDEGEN MEDICAL PRODUCTS, LLC

## (undated) DEVICE — KENDALL 500 SERIES DIAPHORETIC FOAM MONITORING ELECTRODE - TEAR DROP SHAPE ( 30/PK): Brand: KENDALL

## (undated) DEVICE — Device

## (undated) DEVICE — LIMB HOLDER, WRIST/ANKLE: Brand: DEROYAL

## (undated) DEVICE — BITE BLK ENDOSCP AD 54FR GRN POLYETH ENDOSCP W STRP SLD

## (undated) DEVICE — SET ADMIN 16ML TBNG L100IN 2 Y INJ SITE IV PIGGY BK DISP

## (undated) DEVICE — FLEX ADVANTAGE 1500CC: Brand: FLEX ADVANTAGE

## (undated) DEVICE — STRAP POS RESTRAINT 1.5IN WIDE -- VELCRO ALISTRAP

## (undated) DEVICE — MEDI-VAC NON-CONDUCTIVE SUCTION TUBING: Brand: CARDINAL HEALTH

## (undated) DEVICE — GLOVE SURG SZ 75 L114IN FNGR THK98MIL CUF THK75MIL CRM

## (undated) DEVICE — Device: Brand: DEFENDO VALVE AND CONNECTOR KIT

## (undated) DEVICE — BLOCK BITE BLOX W DENTAL RIM --

## (undated) DEVICE — SYR 50ML SLIP TIP NSAF LF STRL --

## (undated) DEVICE — BINDER ABD H12IN COT FOR 45-62IN WAIST UNIV PREM 4 PNL DSGN

## (undated) DEVICE — KIT GASTMY PERC PEG PULL 20FR -- ENDOVIVE BX/2

## (undated) DEVICE — GUIDEWIRE ENDOSCP L450CM DIA0.035IN STR RND STD STIFF BILI

## (undated) DEVICE — (D)PEG NON-BLLN REPL 20F 30CM -- DISC BY MFR